# Patient Record
Sex: FEMALE | Race: WHITE | HISPANIC OR LATINO | URBAN - METROPOLITAN AREA
[De-identification: names, ages, dates, MRNs, and addresses within clinical notes are randomized per-mention and may not be internally consistent; named-entity substitution may affect disease eponyms.]

---

## 2024-03-15 ENCOUNTER — INPATIENT (INPATIENT)
Facility: HOSPITAL | Age: 64
LOS: 5 days | Discharge: NURSING, REHAB OR LONG-TERM CARE | End: 2024-03-21
Attending: INTERNAL MEDICINE | Admitting: INTERNAL MEDICINE
Payer: MEDICARE

## 2024-03-15 VITALS
DIASTOLIC BLOOD PRESSURE: 65 MMHG | TEMPERATURE: 98 F | HEART RATE: 78 BPM | OXYGEN SATURATION: 94 % | SYSTOLIC BLOOD PRESSURE: 140 MMHG | RESPIRATION RATE: 18 BRPM

## 2024-03-15 DIAGNOSIS — G93.41 METABOLIC ENCEPHALOPATHY: ICD-10-CM

## 2024-03-15 LAB
ALBUMIN SERPL ELPH-MCNC: 3.3 G/DL — SIGNIFICANT CHANGE UP (ref 3.3–5)
ALBUMIN SERPL ELPH-MCNC: 3.7 G/DL — SIGNIFICANT CHANGE UP (ref 3.3–5)
ALP SERPL-CCNC: 102 U/L — SIGNIFICANT CHANGE UP (ref 40–120)
ALP SERPL-CCNC: 110 U/L — SIGNIFICANT CHANGE UP (ref 40–120)
ALT FLD-CCNC: 31 U/L — SIGNIFICANT CHANGE UP (ref 4–33)
ALT FLD-CCNC: 38 U/L — HIGH (ref 4–33)
ANION GAP SERPL CALC-SCNC: 10 MMOL/L — SIGNIFICANT CHANGE UP (ref 7–14)
ANION GAP SERPL CALC-SCNC: 9 MMOL/L — SIGNIFICANT CHANGE UP (ref 7–14)
AST SERPL-CCNC: 26 U/L — SIGNIFICANT CHANGE UP (ref 4–32)
AST SERPL-CCNC: 47 U/L — HIGH (ref 4–32)
BASE EXCESS BLDV CALC-SCNC: 2.8 MMOL/L — SIGNIFICANT CHANGE UP (ref -2–3)
BASOPHILS # BLD AUTO: 0.05 K/UL — SIGNIFICANT CHANGE UP (ref 0–0.2)
BASOPHILS NFR BLD AUTO: 0.6 % — SIGNIFICANT CHANGE UP (ref 0–2)
BILIRUB SERPL-MCNC: <0.2 MG/DL — SIGNIFICANT CHANGE UP (ref 0.2–1.2)
BILIRUB SERPL-MCNC: <0.2 MG/DL — SIGNIFICANT CHANGE UP (ref 0.2–1.2)
BLOOD GAS VENOUS COMPREHENSIVE RESULT: SIGNIFICANT CHANGE UP
BUN SERPL-MCNC: 58 MG/DL — HIGH (ref 7–23)
BUN SERPL-MCNC: 58 MG/DL — HIGH (ref 7–23)
CALCIUM SERPL-MCNC: 8.6 MG/DL — SIGNIFICANT CHANGE UP (ref 8.4–10.5)
CALCIUM SERPL-MCNC: 9 MG/DL — SIGNIFICANT CHANGE UP (ref 8.4–10.5)
CHLORIDE BLDV-SCNC: 101 MMOL/L — SIGNIFICANT CHANGE UP (ref 96–108)
CHLORIDE SERPL-SCNC: 101 MMOL/L — SIGNIFICANT CHANGE UP (ref 98–107)
CHLORIDE SERPL-SCNC: 99 MMOL/L — SIGNIFICANT CHANGE UP (ref 98–107)
CO2 BLDV-SCNC: 31.2 MMOL/L — HIGH (ref 22–26)
CO2 SERPL-SCNC: 26 MMOL/L — SIGNIFICANT CHANGE UP (ref 22–31)
CO2 SERPL-SCNC: 26 MMOL/L — SIGNIFICANT CHANGE UP (ref 22–31)
CREAT SERPL-MCNC: 1.04 MG/DL — SIGNIFICANT CHANGE UP (ref 0.5–1.3)
CREAT SERPL-MCNC: 1.04 MG/DL — SIGNIFICANT CHANGE UP (ref 0.5–1.3)
EGFR: 60 ML/MIN/1.73M2 — SIGNIFICANT CHANGE UP
EGFR: 60 ML/MIN/1.73M2 — SIGNIFICANT CHANGE UP
EOSINOPHIL # BLD AUTO: 0.14 K/UL — SIGNIFICANT CHANGE UP (ref 0–0.5)
EOSINOPHIL NFR BLD AUTO: 1.5 % — SIGNIFICANT CHANGE UP (ref 0–6)
GAS PNL BLDV: 134 MMOL/L — LOW (ref 136–145)
GLUCOSE BLDC GLUCOMTR-MCNC: 316 MG/DL — HIGH (ref 70–99)
GLUCOSE BLDV-MCNC: 287 MG/DL — HIGH (ref 70–99)
GLUCOSE SERPL-MCNC: 195 MG/DL — HIGH (ref 70–99)
GLUCOSE SERPL-MCNC: 291 MG/DL — HIGH (ref 70–99)
HCO3 BLDV-SCNC: 30 MMOL/L — HIGH (ref 22–29)
HCT VFR BLD CALC: 25.5 % — LOW (ref 34.5–45)
HCT VFR BLDA CALC: 28 % — LOW (ref 34.5–46.5)
HGB BLD CALC-MCNC: 9.2 G/DL — LOW (ref 11.7–16.1)
HGB BLD-MCNC: 8.1 G/DL — LOW (ref 11.5–15.5)
IANC: 6.99 K/UL — SIGNIFICANT CHANGE UP (ref 1.8–7.4)
IMM GRANULOCYTES NFR BLD AUTO: 0.3 % — SIGNIFICANT CHANGE UP (ref 0–0.9)
LACTATE BLDV-MCNC: 1.4 MMOL/L — SIGNIFICANT CHANGE UP (ref 0.5–2)
LYMPHOCYTES # BLD AUTO: 1.18 K/UL — SIGNIFICANT CHANGE UP (ref 1–3.3)
LYMPHOCYTES # BLD AUTO: 13 % — SIGNIFICANT CHANGE UP (ref 13–44)
MAGNESIUM SERPL-MCNC: 3.2 MG/DL — HIGH (ref 1.6–2.6)
MCHC RBC-ENTMCNC: 28.6 PG — SIGNIFICANT CHANGE UP (ref 27–34)
MCHC RBC-ENTMCNC: 31.8 GM/DL — LOW (ref 32–36)
MCV RBC AUTO: 90.1 FL — SIGNIFICANT CHANGE UP (ref 80–100)
MONOCYTES # BLD AUTO: 0.67 K/UL — SIGNIFICANT CHANGE UP (ref 0–0.9)
MONOCYTES NFR BLD AUTO: 7.4 % — SIGNIFICANT CHANGE UP (ref 2–14)
NEUTROPHILS # BLD AUTO: 6.99 K/UL — SIGNIFICANT CHANGE UP (ref 1.8–7.4)
NEUTROPHILS NFR BLD AUTO: 77.2 % — HIGH (ref 43–77)
NRBC # BLD: 0 /100 WBCS — SIGNIFICANT CHANGE UP (ref 0–0)
NRBC # FLD: 0 K/UL — SIGNIFICANT CHANGE UP (ref 0–0)
PCO2 BLDV: 56 MMHG — HIGH (ref 39–52)
PH BLDV: 7.33 — SIGNIFICANT CHANGE UP (ref 7.32–7.43)
PHOSPHATE SERPL-MCNC: 4.8 MG/DL — HIGH (ref 2.5–4.5)
PLATELET # BLD AUTO: 423 K/UL — HIGH (ref 150–400)
PO2 BLDV: 44 MMHG — SIGNIFICANT CHANGE UP (ref 25–45)
POTASSIUM BLDV-SCNC: 6 MMOL/L — HIGH (ref 3.5–5.1)
POTASSIUM SERPL-MCNC: 6.1 MMOL/L — HIGH (ref 3.5–5.3)
POTASSIUM SERPL-MCNC: 6.7 MMOL/L — CRITICAL HIGH (ref 3.5–5.3)
POTASSIUM SERPL-SCNC: 6.1 MMOL/L — HIGH (ref 3.5–5.3)
POTASSIUM SERPL-SCNC: 6.7 MMOL/L — CRITICAL HIGH (ref 3.5–5.3)
PROT SERPL-MCNC: 6.7 G/DL — SIGNIFICANT CHANGE UP (ref 6–8.3)
PROT SERPL-MCNC: 6.8 G/DL — SIGNIFICANT CHANGE UP (ref 6–8.3)
RBC # BLD: 2.83 M/UL — LOW (ref 3.8–5.2)
RBC # FLD: 15.9 % — HIGH (ref 10.3–14.5)
SAO2 % BLDV: 73.4 % — SIGNIFICANT CHANGE UP (ref 67–88)
SODIUM SERPL-SCNC: 135 MMOL/L — SIGNIFICANT CHANGE UP (ref 135–145)
SODIUM SERPL-SCNC: 136 MMOL/L — SIGNIFICANT CHANGE UP (ref 135–145)
TSH SERPL-MCNC: 10.12 UIU/ML — HIGH (ref 0.27–4.2)
WBC # BLD: 9.06 K/UL — SIGNIFICANT CHANGE UP (ref 3.8–10.5)
WBC # FLD AUTO: 9.06 K/UL — SIGNIFICANT CHANGE UP (ref 3.8–10.5)

## 2024-03-15 PROCEDURE — 71045 X-RAY EXAM CHEST 1 VIEW: CPT | Mod: 26

## 2024-03-15 PROCEDURE — 99285 EMERGENCY DEPT VISIT HI MDM: CPT | Mod: FS

## 2024-03-15 RX ORDER — SODIUM CHLORIDE 9 MG/ML
1000 INJECTION INTRAMUSCULAR; INTRAVENOUS; SUBCUTANEOUS ONCE
Refills: 0 | Status: COMPLETED | OUTPATIENT
Start: 2024-03-15 | End: 2024-03-15

## 2024-03-15 RX ORDER — VANCOMYCIN HCL 1 G
1000 VIAL (EA) INTRAVENOUS ONCE
Refills: 0 | Status: COMPLETED | OUTPATIENT
Start: 2024-03-15 | End: 2024-03-15

## 2024-03-15 RX ORDER — CEFEPIME 1 G/1
1000 INJECTION, POWDER, FOR SOLUTION INTRAMUSCULAR; INTRAVENOUS ONCE
Refills: 0 | Status: COMPLETED | OUTPATIENT
Start: 2024-03-15 | End: 2024-03-15

## 2024-03-15 RX ORDER — HALOPERIDOL DECANOATE 100 MG/ML
5 INJECTION INTRAMUSCULAR ONCE
Refills: 0 | Status: DISCONTINUED | OUTPATIENT
Start: 2024-03-15 | End: 2024-03-15

## 2024-03-15 RX ADMIN — CEFEPIME 100 MILLIGRAM(S): 1 INJECTION, POWDER, FOR SOLUTION INTRAMUSCULAR; INTRAVENOUS at 22:29

## 2024-03-15 RX ADMIN — SODIUM CHLORIDE 1000 MILLILITER(S): 9 INJECTION INTRAMUSCULAR; INTRAVENOUS; SUBCUTANEOUS at 22:56

## 2024-03-15 NOTE — ED ADULT NURSE REASSESSMENT NOTE - NS ED NURSE REASSESS COMMENT FT1
Report received from SONY Cool at 20:00. Pt at baseline mental status, calm and cooperative. Pt denies chest pain, SOB, dizziness, headache, blurry vision, chills. Bed in lowest position, call bell within reach. Report given to the floor, pt awaiting pickup.

## 2024-03-15 NOTE — ED PROVIDER NOTE - PHYSICAL EXAMINATION
Vital signs reviewed.   CONSTITUTIONAL: Well-appearing; well-nourished; in no apparent distress. Non-toxic appearing.   HEAD: Normocephalic, atraumatic.  EYES: PERRL, EOM intact, conjunctiva and sclera WNL.  ENT: normal nose; no rhinorrhea; normal pharynx with no tonsillar hypertrophy, no erythema, no exudate, no lymphadenopathy.  NECK/LYMPH: Supple; non-tender; no cervical lymphadenopathy.  CARD: Normal S1, S2; no murmurs, rubs, or gallops noted.  RESP: Normal chest excursion with respiration; breath sounds clear and equal bilaterally; no wheezes, rhonchi, or rales.  ABD/GI: soft, non-distended; non-tender; no palpable organomegaly, no pulsatile mass.  EXT/MS: moves all extremities; distal pulses are normal, no pedal edema.  SKIN: Normal for age and race; warm; dry; good turgor; no apparent lesions or exudate noted. Stage 1 sacral wound  NEURO: Awake, alert, oriented x 4, no gross deficits, CN II-XII grossly intact, no motor or sensory deficit noted.  PSYCH: Normal mood; appropriate affect.

## 2024-03-15 NOTE — ED ADULT TRIAGE NOTE - NS ED TRIAGE CLINICAL UPGRADE PROVIDER FT
charge SONY Link notified pt pxox 88% on 4 liters. Pt hx of COPD on home oxygen. Resp even unlabored.

## 2024-03-15 NOTE — ED ADULT NURSE NOTE - OBJECTIVE STATEMENT
Patient A&OX4. No distress noted. Breathing non-labored. Denies CP, no SOB noted. Labs sent. Left 20G IVL in place and tolerating well. Patient noted saying she doesn't want to go back to Nursing Home. Patient on RA, breathing non-labored. Patient noted with right AKA with prothesis present at bedside. VS stable at this time. Plan of care in progress. Patient noted upset, therapeutic communication provided. OK to give food as per MD.

## 2024-03-15 NOTE — ED ADULT NURSE NOTE - NSFALLUNIVINTERV_ED_ALL_ED
Bed/Stretcher in lowest position, wheels locked, appropriate side rails in place/Call bell, personal items and telephone in reach/Instruct patient to call for assistance before getting out of bed/chair/stretcher/Non-slip footwear applied when patient is off stretcher/[place] to call system/Physically safe environment - no spills, clutter or unnecessary equipment/Purposeful proactive rounding/Room/bathroom lighting operational, light cord in reach

## 2024-03-15 NOTE — ED PROVIDER NOTE - ATTENDING CONTRIBUTION TO CARE
Dr. Beckwith:  I have personally performed a face to face bedside history and physical examination of this patient. I have discussed the history, examination, review of systems, assessment and plan of management with the resident. I have reviewed the electronic medical record and amended it to reflect my history, review of systems, physical exam, assessment and plan.    63F h/o COPD on O2, CKD, IDDM, bipolar disorder, R BKA, sent from Nursing home for aggressive behavior.  REcently diagnosed with PNA.      Exam:  - nad but seems emotionally labile, tearful often  - rrr  - ctab  - abd soft ntnd    A/P  - aggressive behavior at nursing home  - pt desatted while in room to 60%, improved with NC oxygen  - concern for PNA possibly leading to altered behavior  - abx, admit, psych CL consult

## 2024-03-15 NOTE — ED PROVIDER NOTE - CLINICAL SUMMARY MEDICAL DECISION MAKING FREE TEXT BOX
63-year-old female with past medical history of COPD on chronic O2 unclear O2 level, GERD, anemia, CKD, insulin-dependent diabetes, diabetic neuropathy, bipolar disorder, mood disorder, below the knee amputation on right presents to the ER sent in from [facility] for aggressive behavior.  As per triage note patient throwing items in feces.  Patient reports that she became upset when they did not bring her the food that she wanted so she became aggressive, this has happened multiple times, she is not happy with the care, she will be left in her feces for a while, she developed fungal infection to her buttock area because of it. Also notes recent dx of pna. Denies fevers, chills, cough, chest pain, abdominal pain, nausea, vomiting, dysuria, hematuria, diarrhea.  Denies SI, HI, AVH.  r/o electrolyte abn, uti, thyroid dysfunction.   cxr eval pna    likely behavioral. patient A&o x 4, reporting knowledge and her reason behind aggression

## 2024-03-15 NOTE — ED ADULT NURSE NOTE - CHIEF COMPLAINT QUOTE
Patient brought to ER by EMS from [facility] for aggressive behavior. Patient throwing items and feces. Patient is right below knee amputation. Patient has type 2 DM; FS: 365 in field. Patient on 2 liters O2 for COPD.

## 2024-03-15 NOTE — ED ADULT TRIAGE NOTE - CHIEF COMPLAINT QUOTE
Patient brought to ER by EMS from [facility] for aggressive behavior. Patient throwing items and feces. Patient is right below knee amputation. Patient has type 2 DM; FS: 365 in field. Patient on 2 liters O2 for COPD.

## 2024-03-15 NOTE — ED PROVIDER NOTE - NSICDXPASTMEDICALHX_GEN_ALL_CORE_FT
PAST MEDICAL HISTORY:  Bipolar disorder     COPD with hypoxia     DM (diabetes mellitus)     Stage 4 chronic kidney disease

## 2024-03-15 NOTE — ED PROVIDER NOTE - OBJECTIVE STATEMENT
63-year-old female with past medical history of COPD on chronic O2 unclear O2 level, GERD, anemia, CKD, insulin-dependent diabetes, diabetic neuropathy, bipolar disorder, mood disorder, below the knee amputation on right presents to the ER sent in from [facility] for aggressive behavior.  As per triage note patient throwing items in feces.  Patient reports that she became upset when they did not bring her the food that she wanted so she became aggressive, this has happened multiple times, she is not happy with the care, she will be left in her feces for a while, she developed fungal infection to her buttock area because of it. Also notes recent dx of pna. Denies fevers, chills, cough, chest pain, abdominal pain, nausea, vomiting, dysuria, hematuria, diarrhea.  Denies SI, HI, AVH.

## 2024-03-15 NOTE — ED PROVIDER NOTE - ATTENDING APP SHARED VISIT CONTRIBUTION OF CARE
Dr. Beckwith:  I performed a face to face bedside interview with patient regarding history of present illness, review of symptoms and past medical history. I completed an independent physical exam.  I have discussed patient's plan of care with PA.   I agree with note as stated above, having amended the EMR as needed to reflect my findings.   This includes HISTORY OF PRESENT ILLNESS, HIV, PAST MEDICAL/SURGICAL/FAMILY/SOCIAL HISTORY, ALLERGIES AND HOME MEDICATIONS, REVIEW OF SYSTEMS, PHYSICAL EXAM, and any PROGRESS NOTES during the time I functioned as the attending physician for this patient.        63F h/o COPD on O2, CKD, IDDM, bipolar disorder, R BKA, sent from Nursing home for aggressive behavior.  REcently diagnosed with PNA.      Exam:  - nad but seems emotionally labile, tearful often  - rrr  - ctab  - abd soft ntnd    A/P  - aggressive behavior at nursing home  - pt desatted while in room to 60%, improved with NC oxygen  - concern for PNA possibly leading to altered behavior  - abx, admit, psych CL consult

## 2024-03-16 DIAGNOSIS — Z89.511 ACQUIRED ABSENCE OF RIGHT LEG BELOW KNEE: Chronic | ICD-10-CM

## 2024-03-16 DIAGNOSIS — I48.91 UNSPECIFIED ATRIAL FIBRILLATION: ICD-10-CM

## 2024-03-16 DIAGNOSIS — R79.89 OTHER SPECIFIED ABNORMAL FINDINGS OF BLOOD CHEMISTRY: ICD-10-CM

## 2024-03-16 DIAGNOSIS — Z29.9 ENCOUNTER FOR PROPHYLACTIC MEASURES, UNSPECIFIED: ICD-10-CM

## 2024-03-16 DIAGNOSIS — E11.40 TYPE 2 DIABETES MELLITUS WITH DIABETIC NEUROPATHY, UNSPECIFIED: ICD-10-CM

## 2024-03-16 DIAGNOSIS — J90 PLEURAL EFFUSION, NOT ELSEWHERE CLASSIFIED: ICD-10-CM

## 2024-03-16 DIAGNOSIS — I10 ESSENTIAL (PRIMARY) HYPERTENSION: ICD-10-CM

## 2024-03-16 DIAGNOSIS — R45.1 RESTLESSNESS AND AGITATION: ICD-10-CM

## 2024-03-16 DIAGNOSIS — J44.9 CHRONIC OBSTRUCTIVE PULMONARY DISEASE, UNSPECIFIED: ICD-10-CM

## 2024-03-16 DIAGNOSIS — E78.5 HYPERLIPIDEMIA, UNSPECIFIED: ICD-10-CM

## 2024-03-16 DIAGNOSIS — D64.9 ANEMIA, UNSPECIFIED: ICD-10-CM

## 2024-03-16 DIAGNOSIS — N18.30 CHRONIC KIDNEY DISEASE, STAGE 3 UNSPECIFIED: ICD-10-CM

## 2024-03-16 DIAGNOSIS — I50.32 CHRONIC DIASTOLIC (CONGESTIVE) HEART FAILURE: ICD-10-CM

## 2024-03-16 DIAGNOSIS — F31.9 BIPOLAR DISORDER, UNSPECIFIED: ICD-10-CM

## 2024-03-16 DIAGNOSIS — K21.9 GASTRO-ESOPHAGEAL REFLUX DISEASE WITHOUT ESOPHAGITIS: ICD-10-CM

## 2024-03-16 LAB
A1C WITH ESTIMATED AVERAGE GLUCOSE RESULT: 6.1 % — HIGH (ref 4–5.6)
ADD ON TEST-SPECIMEN IN LAB: SIGNIFICANT CHANGE UP
ADD ON TEST-SPECIMEN IN LAB: SIGNIFICANT CHANGE UP
ALBUMIN SERPL ELPH-MCNC: 3.4 G/DL — SIGNIFICANT CHANGE UP (ref 3.3–5)
ALBUMIN SERPL ELPH-MCNC: 3.7 G/DL — SIGNIFICANT CHANGE UP (ref 3.3–5)
ALP SERPL-CCNC: 112 U/L — SIGNIFICANT CHANGE UP (ref 40–120)
ALP SERPL-CCNC: 114 U/L — SIGNIFICANT CHANGE UP (ref 40–120)
ALT FLD-CCNC: 34 U/L — HIGH (ref 4–33)
ALT FLD-CCNC: 40 U/L — HIGH (ref 4–33)
ANION GAP SERPL CALC-SCNC: 9 MMOL/L — SIGNIFICANT CHANGE UP (ref 7–14)
ANION GAP SERPL CALC-SCNC: 9 MMOL/L — SIGNIFICANT CHANGE UP (ref 7–14)
APPEARANCE UR: CLEAR — SIGNIFICANT CHANGE UP
AST SERPL-CCNC: 23 U/L — SIGNIFICANT CHANGE UP (ref 4–32)
AST SERPL-CCNC: 32 U/L — SIGNIFICANT CHANGE UP (ref 4–32)
BASE EXCESS BLDV CALC-SCNC: 2.6 MMOL/L — SIGNIFICANT CHANGE UP (ref -2–3)
BASOPHILS # BLD AUTO: 0.07 K/UL — SIGNIFICANT CHANGE UP (ref 0–0.2)
BASOPHILS NFR BLD AUTO: 0.7 % — SIGNIFICANT CHANGE UP (ref 0–2)
BILIRUB SERPL-MCNC: <0.2 MG/DL — SIGNIFICANT CHANGE UP (ref 0.2–1.2)
BILIRUB SERPL-MCNC: <0.2 MG/DL — SIGNIFICANT CHANGE UP (ref 0.2–1.2)
BILIRUB UR-MCNC: NEGATIVE — SIGNIFICANT CHANGE UP
BLOOD GAS VENOUS COMPREHENSIVE RESULT: SIGNIFICANT CHANGE UP
BUN SERPL-MCNC: 53 MG/DL — HIGH (ref 7–23)
BUN SERPL-MCNC: 55 MG/DL — HIGH (ref 7–23)
CALCIUM SERPL-MCNC: 8.9 MG/DL — SIGNIFICANT CHANGE UP (ref 8.4–10.5)
CALCIUM SERPL-MCNC: 9.3 MG/DL — SIGNIFICANT CHANGE UP (ref 8.4–10.5)
CHLORIDE BLDV-SCNC: 103 MMOL/L — SIGNIFICANT CHANGE UP (ref 96–108)
CHLORIDE SERPL-SCNC: 101 MMOL/L — SIGNIFICANT CHANGE UP (ref 98–107)
CHLORIDE SERPL-SCNC: 102 MMOL/L — SIGNIFICANT CHANGE UP (ref 98–107)
CHOLEST SERPL-MCNC: 184 MG/DL — SIGNIFICANT CHANGE UP
CK SERPL-CCNC: 85 U/L — SIGNIFICANT CHANGE UP (ref 25–170)
CO2 BLDV-SCNC: 31.1 MMOL/L — HIGH (ref 22–26)
CO2 SERPL-SCNC: 26 MMOL/L — SIGNIFICANT CHANGE UP (ref 22–31)
CO2 SERPL-SCNC: 26 MMOL/L — SIGNIFICANT CHANGE UP (ref 22–31)
COLOR SPEC: YELLOW — SIGNIFICANT CHANGE UP
CREAT SERPL-MCNC: 0.96 MG/DL — SIGNIFICANT CHANGE UP (ref 0.5–1.3)
CREAT SERPL-MCNC: 1.06 MG/DL — SIGNIFICANT CHANGE UP (ref 0.5–1.3)
DIFF PNL FLD: ABNORMAL
EGFR: 58 ML/MIN/1.73M2 — LOW
EGFR: 66 ML/MIN/1.73M2 — SIGNIFICANT CHANGE UP
EOSINOPHIL # BLD AUTO: 0.16 K/UL — SIGNIFICANT CHANGE UP (ref 0–0.5)
EOSINOPHIL NFR BLD AUTO: 1.5 % — SIGNIFICANT CHANGE UP (ref 0–6)
ESTIMATED AVERAGE GLUCOSE: 128 — SIGNIFICANT CHANGE UP
GAS PNL BLDV: 135 MMOL/L — LOW (ref 136–145)
GLUCOSE BLDC GLUCOMTR-MCNC: 154 MG/DL — HIGH (ref 70–99)
GLUCOSE BLDC GLUCOMTR-MCNC: 155 MG/DL — HIGH (ref 70–99)
GLUCOSE BLDC GLUCOMTR-MCNC: 208 MG/DL — HIGH (ref 70–99)
GLUCOSE BLDC GLUCOMTR-MCNC: 214 MG/DL — HIGH (ref 70–99)
GLUCOSE BLDC GLUCOMTR-MCNC: 98 MG/DL — SIGNIFICANT CHANGE UP (ref 70–99)
GLUCOSE BLDV-MCNC: 200 MG/DL — HIGH (ref 70–99)
GLUCOSE SERPL-MCNC: 171 MG/DL — HIGH (ref 70–99)
GLUCOSE SERPL-MCNC: 204 MG/DL — HIGH (ref 70–99)
GLUCOSE UR QL: >=1000 MG/DL
HCO3 BLDV-SCNC: 29 MMOL/L — SIGNIFICANT CHANGE UP (ref 22–29)
HCT VFR BLD CALC: 26.1 % — LOW (ref 34.5–45)
HCT VFR BLDA CALC: 26 % — LOW (ref 34.5–46.5)
HDLC SERPL-MCNC: 86 MG/DL — SIGNIFICANT CHANGE UP
HGB BLD CALC-MCNC: 8.7 G/DL — LOW (ref 11.7–16.1)
HGB BLD-MCNC: 8.4 G/DL — LOW (ref 11.5–15.5)
IANC: 8.67 K/UL — HIGH (ref 1.8–7.4)
IMM GRANULOCYTES NFR BLD AUTO: 0.3 % — SIGNIFICANT CHANGE UP (ref 0–0.9)
KETONES UR-MCNC: NEGATIVE MG/DL — SIGNIFICANT CHANGE UP
LACTATE BLDV-MCNC: 0.7 MMOL/L — SIGNIFICANT CHANGE UP (ref 0.5–2)
LEUKOCYTE ESTERASE UR-ACNC: NEGATIVE — SIGNIFICANT CHANGE UP
LIPID PNL WITH DIRECT LDL SERPL: 87 MG/DL — SIGNIFICANT CHANGE UP
LYMPHOCYTES # BLD AUTO: 0.84 K/UL — LOW (ref 1–3.3)
LYMPHOCYTES # BLD AUTO: 8 % — LOW (ref 13–44)
MAGNESIUM SERPL-MCNC: 3 MG/DL — HIGH (ref 1.6–2.6)
MAGNESIUM SERPL-MCNC: 3.1 MG/DL — HIGH (ref 1.6–2.6)
MCHC RBC-ENTMCNC: 29.2 PG — SIGNIFICANT CHANGE UP (ref 27–34)
MCHC RBC-ENTMCNC: 32.2 GM/DL — SIGNIFICANT CHANGE UP (ref 32–36)
MCV RBC AUTO: 90.6 FL — SIGNIFICANT CHANGE UP (ref 80–100)
MONOCYTES # BLD AUTO: 0.71 K/UL — SIGNIFICANT CHANGE UP (ref 0–0.9)
MONOCYTES NFR BLD AUTO: 6.8 % — SIGNIFICANT CHANGE UP (ref 2–14)
NEUTROPHILS # BLD AUTO: 8.67 K/UL — HIGH (ref 1.8–7.4)
NEUTROPHILS NFR BLD AUTO: 82.7 % — HIGH (ref 43–77)
NITRITE UR-MCNC: NEGATIVE — SIGNIFICANT CHANGE UP
NON HDL CHOLESTEROL: 98 MG/DL — SIGNIFICANT CHANGE UP
NRBC # BLD: 0 /100 WBCS — SIGNIFICANT CHANGE UP (ref 0–0)
NRBC # FLD: 0 K/UL — SIGNIFICANT CHANGE UP (ref 0–0)
NT-PROBNP SERPL-SCNC: 8356 PG/ML — HIGH
PCO2 BLDV: 57 MMHG — HIGH (ref 39–52)
PH BLDV: 7.32 — SIGNIFICANT CHANGE UP (ref 7.32–7.43)
PH UR: 5.5 — SIGNIFICANT CHANGE UP (ref 5–8)
PHOSPHATE SERPL-MCNC: 4.5 MG/DL — SIGNIFICANT CHANGE UP (ref 2.5–4.5)
PHOSPHATE SERPL-MCNC: 4.9 MG/DL — HIGH (ref 2.5–4.5)
PLATELET # BLD AUTO: 401 K/UL — HIGH (ref 150–400)
PO2 BLDV: 48 MMHG — HIGH (ref 25–45)
POTASSIUM BLDV-SCNC: 5.5 MMOL/L — HIGH (ref 3.5–5.1)
POTASSIUM SERPL-MCNC: 5.3 MMOL/L — SIGNIFICANT CHANGE UP (ref 3.5–5.3)
POTASSIUM SERPL-MCNC: 5.4 MMOL/L — HIGH (ref 3.5–5.3)
POTASSIUM SERPL-SCNC: 5.3 MMOL/L — SIGNIFICANT CHANGE UP (ref 3.5–5.3)
POTASSIUM SERPL-SCNC: 5.4 MMOL/L — HIGH (ref 3.5–5.3)
PROT SERPL-MCNC: 6.4 G/DL — SIGNIFICANT CHANGE UP (ref 6–8.3)
PROT SERPL-MCNC: 6.6 G/DL — SIGNIFICANT CHANGE UP (ref 6–8.3)
PROT UR-MCNC: >=1000 MG/DL
RBC # BLD: 2.88 M/UL — LOW (ref 3.8–5.2)
RBC # FLD: 15.8 % — HIGH (ref 10.3–14.5)
SAO2 % BLDV: 80.6 % — SIGNIFICANT CHANGE UP (ref 67–88)
SODIUM SERPL-SCNC: 136 MMOL/L — SIGNIFICANT CHANGE UP (ref 135–145)
SODIUM SERPL-SCNC: 137 MMOL/L — SIGNIFICANT CHANGE UP (ref 135–145)
SP GR SPEC: 1.03 — SIGNIFICANT CHANGE UP (ref 1–1.03)
T4 FREE SERPL-MCNC: 1 NG/DL — SIGNIFICANT CHANGE UP (ref 0.9–1.8)
T4/T3 UPTAKE INDEX SERPL: 1.1 TBI — SIGNIFICANT CHANGE UP (ref 0.8–1.3)
TRIGL SERPL-MCNC: 53 MG/DL — SIGNIFICANT CHANGE UP
TROPONIN T, HIGH SENSITIVITY RESULT: 104 NG/L — CRITICAL HIGH
TROPONIN T, HIGH SENSITIVITY RESULT: 112 NG/L — CRITICAL HIGH
TROPONIN T, HIGH SENSITIVITY RESULT: 116 NG/L — CRITICAL HIGH
UROBILINOGEN FLD QL: 0.2 MG/DL — SIGNIFICANT CHANGE UP (ref 0.2–1)
WBC # BLD: 10.48 K/UL — SIGNIFICANT CHANGE UP (ref 3.8–10.5)
WBC # FLD AUTO: 10.48 K/UL — SIGNIFICANT CHANGE UP (ref 3.8–10.5)

## 2024-03-16 PROCEDURE — 71250 CT THORAX DX C-: CPT | Mod: 26

## 2024-03-16 PROCEDURE — G0316 PROLONG INPT EVAL ADD15 M: CPT

## 2024-03-16 PROCEDURE — 93010 ELECTROCARDIOGRAM REPORT: CPT

## 2024-03-16 PROCEDURE — 99223 1ST HOSP IP/OBS HIGH 75: CPT

## 2024-03-16 PROCEDURE — 90792 PSYCH DIAG EVAL W/MED SRVCS: CPT

## 2024-03-16 RX ORDER — DEXTROSE 50 % IN WATER 50 %
25 SYRINGE (ML) INTRAVENOUS ONCE
Refills: 0 | Status: DISCONTINUED | OUTPATIENT
Start: 2024-03-16 | End: 2024-03-16

## 2024-03-16 RX ORDER — ACETAMINOPHEN 500 MG
650 TABLET ORAL EVERY 6 HOURS
Refills: 0 | Status: DISCONTINUED | OUTPATIENT
Start: 2024-03-16 | End: 2024-03-21

## 2024-03-16 RX ORDER — INSULIN LISPRO 100/ML
VIAL (ML) SUBCUTANEOUS AT BEDTIME
Refills: 0 | Status: DISCONTINUED | OUTPATIENT
Start: 2024-03-16 | End: 2024-03-16

## 2024-03-16 RX ORDER — LABETALOL HCL 100 MG
300 TABLET ORAL EVERY 8 HOURS
Refills: 0 | Status: DISCONTINUED | OUTPATIENT
Start: 2024-03-16 | End: 2024-03-21

## 2024-03-16 RX ORDER — APIXABAN 2.5 MG/1
1 TABLET, FILM COATED ORAL
Refills: 0 | DISCHARGE

## 2024-03-16 RX ORDER — LANOLIN ALCOHOL/MO/W.PET/CERES
1 CREAM (GRAM) TOPICAL
Refills: 0 | DISCHARGE

## 2024-03-16 RX ORDER — SODIUM CHLORIDE 9 MG/ML
1000 INJECTION, SOLUTION INTRAVENOUS
Refills: 0 | Status: DISCONTINUED | OUTPATIENT
Start: 2024-03-16 | End: 2024-03-16

## 2024-03-16 RX ORDER — BUMETANIDE 0.25 MG/ML
0 INJECTION INTRAMUSCULAR; INTRAVENOUS
Qty: 0 | Refills: 0 | DISCHARGE

## 2024-03-16 RX ORDER — IPRATROPIUM/ALBUTEROL SULFATE 18-103MCG
3 AEROSOL WITH ADAPTER (GRAM) INHALATION EVERY 6 HOURS
Refills: 0 | Status: DISCONTINUED | OUTPATIENT
Start: 2024-03-16 | End: 2024-03-21

## 2024-03-16 RX ORDER — DIVALPROEX SODIUM 500 MG/1
250 TABLET, DELAYED RELEASE ORAL DAILY
Refills: 0 | Status: DISCONTINUED | OUTPATIENT
Start: 2024-03-16 | End: 2024-03-16

## 2024-03-16 RX ORDER — INSULIN GLARGINE 100 [IU]/ML
0 INJECTION, SOLUTION SUBCUTANEOUS
Qty: 0 | Refills: 0 | DISCHARGE

## 2024-03-16 RX ORDER — ALBUTEROL 90 UG/1
0 AEROSOL, METERED ORAL
Qty: 0 | Refills: 0 | DISCHARGE

## 2024-03-16 RX ORDER — SPIRONOLACTONE 25 MG/1
0 TABLET, FILM COATED ORAL
Qty: 0 | Refills: 0 | DISCHARGE

## 2024-03-16 RX ORDER — OLANZAPINE 15 MG/1
1.25 TABLET, FILM COATED ORAL EVERY 6 HOURS
Refills: 0 | Status: DISCONTINUED | OUTPATIENT
Start: 2024-03-16 | End: 2024-03-21

## 2024-03-16 RX ORDER — EMPAGLIFLOZIN 10 MG/1
0 TABLET, FILM COATED ORAL
Qty: 0 | Refills: 0 | DISCHARGE

## 2024-03-16 RX ORDER — LIDOCAINE 4 G/100G
1 CREAM TOPICAL
Refills: 0 | DISCHARGE

## 2024-03-16 RX ORDER — DEXTROSE 50 % IN WATER 50 %
50 SYRINGE (ML) INTRAVENOUS ONCE
Refills: 0 | Status: COMPLETED | OUTPATIENT
Start: 2024-03-16 | End: 2024-03-16

## 2024-03-16 RX ORDER — LABETALOL HCL 100 MG
3 TABLET ORAL
Refills: 0 | DISCHARGE

## 2024-03-16 RX ORDER — PIPERACILLIN AND TAZOBACTAM 4; .5 G/20ML; G/20ML
3.38 INJECTION, POWDER, LYOPHILIZED, FOR SOLUTION INTRAVENOUS EVERY 8 HOURS
Refills: 0 | Status: DISCONTINUED | OUTPATIENT
Start: 2024-03-16 | End: 2024-03-20

## 2024-03-16 RX ORDER — DEXTROSE 50 % IN WATER 50 %
12.5 SYRINGE (ML) INTRAVENOUS ONCE
Refills: 0 | Status: DISCONTINUED | OUTPATIENT
Start: 2024-03-16 | End: 2024-03-21

## 2024-03-16 RX ORDER — INSULIN LISPRO 100/ML
VIAL (ML) SUBCUTANEOUS
Refills: 0 | Status: DISCONTINUED | OUTPATIENT
Start: 2024-03-16 | End: 2024-03-16

## 2024-03-16 RX ORDER — SIMVASTATIN 20 MG/1
0 TABLET, FILM COATED ORAL
Qty: 0 | Refills: 0 | DISCHARGE

## 2024-03-16 RX ORDER — SODIUM ZIRCONIUM CYCLOSILICATE 10 G/10G
5 POWDER, FOR SUSPENSION ORAL ONCE
Refills: 0 | Status: COMPLETED | OUTPATIENT
Start: 2024-03-16 | End: 2024-03-16

## 2024-03-16 RX ORDER — INSULIN LISPRO 100/ML
6 VIAL (ML) SUBCUTANEOUS
Refills: 0 | Status: DISCONTINUED | OUTPATIENT
Start: 2024-03-16 | End: 2024-03-21

## 2024-03-16 RX ORDER — SODIUM CHLORIDE 9 MG/ML
1000 INJECTION, SOLUTION INTRAVENOUS
Refills: 0 | Status: DISCONTINUED | OUTPATIENT
Start: 2024-03-16 | End: 2024-03-21

## 2024-03-16 RX ORDER — DIVALPROEX SODIUM 500 MG/1
0 TABLET, DELAYED RELEASE ORAL
Qty: 0 | Refills: 0 | DISCHARGE

## 2024-03-16 RX ORDER — DEXTROSE 50 % IN WATER 50 %
15 SYRINGE (ML) INTRAVENOUS ONCE
Refills: 0 | Status: DISCONTINUED | OUTPATIENT
Start: 2024-03-16 | End: 2024-03-16

## 2024-03-16 RX ORDER — GLUCAGON INJECTION, SOLUTION 0.5 MG/.1ML
1 INJECTION, SOLUTION SUBCUTANEOUS ONCE
Refills: 0 | Status: DISCONTINUED | OUTPATIENT
Start: 2024-03-16 | End: 2024-03-16

## 2024-03-16 RX ORDER — INFLUENZA VIRUS VACCINE 15; 15; 15; 15 UG/.5ML; UG/.5ML; UG/.5ML; UG/.5ML
0.7 SUSPENSION INTRAMUSCULAR ONCE
Refills: 0 | Status: DISCONTINUED | OUTPATIENT
Start: 2024-03-16 | End: 2024-03-21

## 2024-03-16 RX ORDER — ALBUTEROL 90 UG/1
3 AEROSOL, METERED ORAL
Refills: 0 | DISCHARGE

## 2024-03-16 RX ORDER — INSULIN LISPRO 100/ML
VIAL (ML) SUBCUTANEOUS AT BEDTIME
Refills: 0 | Status: DISCONTINUED | OUTPATIENT
Start: 2024-03-16 | End: 2024-03-21

## 2024-03-16 RX ORDER — CALCIUM GLUCONATE 100 MG/ML
2 VIAL (ML) INTRAVENOUS ONCE
Refills: 0 | Status: COMPLETED | OUTPATIENT
Start: 2024-03-16 | End: 2024-03-16

## 2024-03-16 RX ORDER — BUDESONIDE AND FORMOTEROL FUMARATE DIHYDRATE 160; 4.5 UG/1; UG/1
0 AEROSOL RESPIRATORY (INHALATION)
Qty: 0 | Refills: 0 | DISCHARGE

## 2024-03-16 RX ORDER — IPRATROPIUM/ALBUTEROL SULFATE 18-103MCG
3 AEROSOL WITH ADAPTER (GRAM) INHALATION
Refills: 0 | DISCHARGE

## 2024-03-16 RX ORDER — DEXTROSE 50 % IN WATER 50 %
25 SYRINGE (ML) INTRAVENOUS ONCE
Refills: 0 | Status: DISCONTINUED | OUTPATIENT
Start: 2024-03-16 | End: 2024-03-21

## 2024-03-16 RX ORDER — NIFEDIPINE 30 MG
30 TABLET, EXTENDED RELEASE 24 HR ORAL DAILY
Refills: 0 | Status: DISCONTINUED | OUTPATIENT
Start: 2024-03-16 | End: 2024-03-21

## 2024-03-16 RX ORDER — INSULIN HUMAN 100 [IU]/ML
10 INJECTION, SOLUTION SUBCUTANEOUS ONCE
Refills: 0 | Status: COMPLETED | OUTPATIENT
Start: 2024-03-16 | End: 2024-03-16

## 2024-03-16 RX ORDER — INSULIN LISPRO 100/ML
0 VIAL (ML) SUBCUTANEOUS
Qty: 0 | Refills: 0 | DISCHARGE

## 2024-03-16 RX ORDER — BUDESONIDE AND FORMOTEROL FUMARATE DIHYDRATE 160; 4.5 UG/1; UG/1
2 AEROSOL RESPIRATORY (INHALATION)
Refills: 0 | Status: DISCONTINUED | OUTPATIENT
Start: 2024-03-16 | End: 2024-03-21

## 2024-03-16 RX ORDER — HYDRALAZINE HCL 50 MG
50 TABLET ORAL EVERY 8 HOURS
Refills: 0 | Status: DISCONTINUED | OUTPATIENT
Start: 2024-03-16 | End: 2024-03-21

## 2024-03-16 RX ORDER — LIDOCAINE 4 G/100G
1 CREAM TOPICAL DAILY
Refills: 0 | Status: DISCONTINUED | OUTPATIENT
Start: 2024-03-16 | End: 2024-03-21

## 2024-03-16 RX ORDER — LABETALOL HCL 100 MG
0 TABLET ORAL
Qty: 0 | Refills: 0 | DISCHARGE

## 2024-03-16 RX ORDER — AMIODARONE HYDROCHLORIDE 400 MG/1
1 TABLET ORAL
Refills: 0 | DISCHARGE

## 2024-03-16 RX ORDER — AMIODARONE HYDROCHLORIDE 400 MG/1
200 TABLET ORAL DAILY
Refills: 0 | Status: DISCONTINUED | OUTPATIENT
Start: 2024-03-16 | End: 2024-03-21

## 2024-03-16 RX ORDER — DEXTROSE 50 % IN WATER 50 %
15 SYRINGE (ML) INTRAVENOUS ONCE
Refills: 0 | Status: DISCONTINUED | OUTPATIENT
Start: 2024-03-16 | End: 2024-03-21

## 2024-03-16 RX ORDER — PANTOPRAZOLE SODIUM 20 MG/1
40 TABLET, DELAYED RELEASE ORAL
Refills: 0 | Status: DISCONTINUED | OUTPATIENT
Start: 2024-03-16 | End: 2024-03-21

## 2024-03-16 RX ORDER — ONDANSETRON 8 MG/1
4 TABLET, FILM COATED ORAL EVERY 8 HOURS
Refills: 0 | Status: DISCONTINUED | OUTPATIENT
Start: 2024-03-16 | End: 2024-03-21

## 2024-03-16 RX ORDER — INSULIN LISPRO 100/ML
VIAL (ML) SUBCUTANEOUS
Refills: 0 | Status: DISCONTINUED | OUTPATIENT
Start: 2024-03-16 | End: 2024-03-21

## 2024-03-16 RX ORDER — ERGOCALCIFEROL 1.25 MG/1
1 CAPSULE ORAL
Refills: 0 | DISCHARGE

## 2024-03-16 RX ORDER — ERTUGLIFLOZIN 5 MG/1
1 TABLET, FILM COATED ORAL
Refills: 0 | DISCHARGE

## 2024-03-16 RX ORDER — HYDROCORTISONE 1 %
1 OINTMENT (GRAM) TOPICAL
Refills: 0 | DISCHARGE

## 2024-03-16 RX ORDER — INSULIN GLARGINE 100 [IU]/ML
18 INJECTION, SOLUTION SUBCUTANEOUS AT BEDTIME
Refills: 0 | Status: DISCONTINUED | OUTPATIENT
Start: 2024-03-16 | End: 2024-03-21

## 2024-03-16 RX ORDER — IPRATROPIUM/ALBUTEROL SULFATE 18-103MCG
0 AEROSOL WITH ADAPTER (GRAM) INHALATION
Qty: 0 | Refills: 0 | DISCHARGE

## 2024-03-16 RX ORDER — ERTUGLIFLOZIN 5 MG/1
0 TABLET, FILM COATED ORAL
Qty: 0 | Refills: 0 | DISCHARGE

## 2024-03-16 RX ORDER — APIXABAN 2.5 MG/1
0 TABLET, FILM COATED ORAL
Qty: 0 | Refills: 0 | DISCHARGE

## 2024-03-16 RX ORDER — SIMVASTATIN 20 MG/1
1 TABLET, FILM COATED ORAL
Refills: 0 | DISCHARGE

## 2024-03-16 RX ORDER — DEXTROSE 50 % IN WATER 50 %
12.5 SYRINGE (ML) INTRAVENOUS ONCE
Refills: 0 | Status: DISCONTINUED | OUTPATIENT
Start: 2024-03-16 | End: 2024-03-16

## 2024-03-16 RX ORDER — LANOLIN ALCOHOL/MO/W.PET/CERES
3 CREAM (GRAM) TOPICAL AT BEDTIME
Refills: 0 | Status: DISCONTINUED | OUTPATIENT
Start: 2024-03-16 | End: 2024-03-21

## 2024-03-16 RX ORDER — AMIODARONE HYDROCHLORIDE 400 MG/1
0 TABLET ORAL
Qty: 0 | Refills: 0 | DISCHARGE

## 2024-03-16 RX ORDER — GLUCAGON INJECTION, SOLUTION 0.5 MG/.1ML
1 INJECTION, SOLUTION SUBCUTANEOUS ONCE
Refills: 0 | Status: DISCONTINUED | OUTPATIENT
Start: 2024-03-16 | End: 2024-03-21

## 2024-03-16 RX ORDER — NIFEDIPINE 30 MG
0 TABLET, EXTENDED RELEASE 24 HR ORAL
Qty: 0 | Refills: 0 | DISCHARGE

## 2024-03-16 RX ORDER — DIVALPROEX SODIUM 500 MG/1
250 TABLET, DELAYED RELEASE ORAL
Refills: 0 | Status: DISCONTINUED | OUTPATIENT
Start: 2024-03-16 | End: 2024-03-16

## 2024-03-16 RX ORDER — HYDRALAZINE HCL 50 MG
0 TABLET ORAL
Qty: 0 | Refills: 0 | DISCHARGE

## 2024-03-16 RX ORDER — NIFEDIPINE 30 MG
1 TABLET, EXTENDED RELEASE 24 HR ORAL
Refills: 0 | DISCHARGE

## 2024-03-16 RX ORDER — DIVALPROEX SODIUM 500 MG/1
250 TABLET, DELAYED RELEASE ORAL
Refills: 0 | Status: DISCONTINUED | OUTPATIENT
Start: 2024-03-16 | End: 2024-03-21

## 2024-03-16 RX ORDER — SIMVASTATIN 20 MG/1
20 TABLET, FILM COATED ORAL AT BEDTIME
Refills: 0 | Status: DISCONTINUED | OUTPATIENT
Start: 2024-03-16 | End: 2024-03-21

## 2024-03-16 RX ORDER — OMEPRAZOLE 10 MG/1
2 CAPSULE, DELAYED RELEASE ORAL
Refills: 0 | DISCHARGE

## 2024-03-16 RX ORDER — APIXABAN 2.5 MG/1
5 TABLET, FILM COATED ORAL EVERY 12 HOURS
Refills: 0 | Status: DISCONTINUED | OUTPATIENT
Start: 2024-03-16 | End: 2024-03-21

## 2024-03-16 RX ADMIN — APIXABAN 5 MILLIGRAM(S): 2.5 TABLET, FILM COATED ORAL at 17:43

## 2024-03-16 RX ADMIN — PIPERACILLIN AND TAZOBACTAM 25 GRAM(S): 4; .5 INJECTION, POWDER, LYOPHILIZED, FOR SOLUTION INTRAVENOUS at 13:02

## 2024-03-16 RX ADMIN — BUDESONIDE AND FORMOTEROL FUMARATE DIHYDRATE 2 PUFF(S): 160; 4.5 AEROSOL RESPIRATORY (INHALATION) at 21:49

## 2024-03-16 RX ADMIN — BUDESONIDE AND FORMOTEROL FUMARATE DIHYDRATE 2 PUFF(S): 160; 4.5 AEROSOL RESPIRATORY (INHALATION) at 08:51

## 2024-03-16 RX ADMIN — Medication 30 MILLIGRAM(S): at 08:50

## 2024-03-16 RX ADMIN — Medication 50 MILLIGRAM(S): at 21:53

## 2024-03-16 RX ADMIN — DIVALPROEX SODIUM 250 MILLIGRAM(S): 500 TABLET, DELAYED RELEASE ORAL at 12:54

## 2024-03-16 RX ADMIN — SIMVASTATIN 20 MILLIGRAM(S): 20 TABLET, FILM COATED ORAL at 21:52

## 2024-03-16 RX ADMIN — Medication 1: at 08:48

## 2024-03-16 RX ADMIN — Medication 50 MILLILITER(S): at 03:47

## 2024-03-16 RX ADMIN — Medication 50 MILLIGRAM(S): at 10:10

## 2024-03-16 RX ADMIN — BUDESONIDE AND FORMOTEROL FUMARATE DIHYDRATE 2 PUFF(S): 160; 4.5 AEROSOL RESPIRATORY (INHALATION) at 03:29

## 2024-03-16 RX ADMIN — Medication 6 UNIT(S): at 12:53

## 2024-03-16 RX ADMIN — INSULIN GLARGINE 18 UNIT(S): 100 INJECTION, SOLUTION SUBCUTANEOUS at 22:23

## 2024-03-16 RX ADMIN — Medication 1: at 17:41

## 2024-03-16 RX ADMIN — Medication 250 MILLIGRAM(S): at 01:26

## 2024-03-16 RX ADMIN — SODIUM ZIRCONIUM CYCLOSILICATE 5 GRAM(S): 10 POWDER, FOR SUSPENSION ORAL at 06:48

## 2024-03-16 RX ADMIN — Medication 300 MILLIGRAM(S): at 13:02

## 2024-03-16 RX ADMIN — Medication 2: at 12:53

## 2024-03-16 RX ADMIN — Medication 3 MILLIGRAM(S): at 21:53

## 2024-03-16 RX ADMIN — Medication 6 UNIT(S): at 17:41

## 2024-03-16 RX ADMIN — Medication 300 MILLIGRAM(S): at 21:52

## 2024-03-16 RX ADMIN — PIPERACILLIN AND TAZOBACTAM 25 GRAM(S): 4; .5 INJECTION, POWDER, LYOPHILIZED, FOR SOLUTION INTRAVENOUS at 21:49

## 2024-03-16 RX ADMIN — DIVALPROEX SODIUM 250 MILLIGRAM(S): 500 TABLET, DELAYED RELEASE ORAL at 21:58

## 2024-03-16 RX ADMIN — INSULIN HUMAN 10 UNIT(S): 100 INJECTION, SOLUTION SUBCUTANEOUS at 03:49

## 2024-03-16 RX ADMIN — AMIODARONE HYDROCHLORIDE 200 MILLIGRAM(S): 400 TABLET ORAL at 08:50

## 2024-03-16 RX ADMIN — Medication 200 GRAM(S): at 04:21

## 2024-03-16 RX ADMIN — PANTOPRAZOLE SODIUM 40 MILLIGRAM(S): 20 TABLET, DELAYED RELEASE ORAL at 08:50

## 2024-03-16 NOTE — PATIENT PROFILE ADULT - FALL HARM RISK - HARM RISK INTERVENTIONS
Assistance with ambulation/Assistance OOB with selected safe patient handling equipment/Communicate Risk of Fall with Harm to all staff/Discuss with provider need for PT consult/Monitor gait and stability/Reinforce activity limits and safety measures with patient and family/Tailored Fall Risk Interventions/Use of alarms - bed, chair and/or voice tab/Visual Cue: Yellow wristband and red socks/Bed in lowest position, wheels locked, appropriate side rails in place/Call bell, personal items and telephone in reach/Instruct patient to call for assistance before getting out of bed or chair/Non-slip footwear when patient is out of bed/[place] to call system/Physically safe environment - no spills, clutter or unnecessary equipment/Purposeful Proactive Rounding/Room/bathroom lighting operational, light cord in reach

## 2024-03-16 NOTE — PROGRESS NOTE ADULT - PROBLEM SELECTOR PLAN 12
DVT ppx - Eliquis  Diet - On regular texture and thin consistency diet at [facility] -> will place on regular DASH/CC 1.5L fluid restriction diet here  Activity - OOB with assistance, increase as tolerated    Fall and aspiration precautions

## 2024-03-16 NOTE — H&P ADULT - PROBLEM SELECTOR PLAN 7
- c/w NC, c/w inhaler therapy (of note, patient was prescribed symbicort as per [employer] but not on the medication list from her [facility], will c/w for now)

## 2024-03-16 NOTE — H&P ADULT - PROBLEM SELECTOR PLAN 12
DVT ppx - Eliquis  Diet - On regular texture and thin consistency diet at [facility] -> will place on regular DASH/CC 1.5L fluid restriction diet here  Activity - OOB with assistance, increase as tolerated    Fall and aspiration precautions

## 2024-03-16 NOTE — H&P ADULT - NSICDXPASTMEDICALHX_GEN_ALL_CORE_FT
PAST MEDICAL HISTORY:  Anemia     Bipolar disorder     Chronic diastolic heart failure     COPD with hypoxia     DM (diabetes mellitus)     Essential hypertension     GERD (gastroesophageal reflux disease)     Hyperlipemia     Stage 4 chronic kidney disease     Unspecified atrial fibrillation

## 2024-03-16 NOTE — PATIENT PROFILE ADULT - OVER THE PAST TWO WEEKS, HAVE YOU FELT LITTLE INTEREST OR PLEASURE IN DOING THINGS?
From home  monitor ILEANA, on IVF  plan for Esophagram/follow up GI r  PT consulted From home  monitor ILEANA, on IVF  plan for Esophagram/follow up GI r  PT consulted From home  monitor ILEANA, on IVF  plan for Esophagram/follow up GI reccs 3/1  PT consulted no

## 2024-03-16 NOTE — BH CONSULTATION LIAISON ASSESSMENT NOTE - RISK ASSESSMENT
Risk Factors: acute/chronic medical conditions, prolonged hospital stay, agitation  Protective Factors: residential stability, supportive family

## 2024-03-16 NOTE — BH CONSULTATION LIAISON ASSESSMENT NOTE - NSBHCHARTREVIEWLAB_PSY_A_CORE FT
CBC Full  -  ( 16 Mar 2024 09:28 )  WBC Count : 10.48 K/uL  RBC Count : 2.88 M/uL  Hemoglobin : 8.4 g/dL  Hematocrit : 26.1 %  Platelet Count - Automated : 401 K/uL  Mean Cell Volume : 90.6 fL  Mean Cell Hemoglobin : 29.2 pg  Mean Cell Hemoglobin Concentration : 32.2 gm/dL  Auto Neutrophil # : 8.67 K/uL  Auto Lymphocyte # : 0.84 K/uL  Auto Monocyte # : 0.71 K/uL  Auto Eosinophil # : 0.16 K/uL  Auto Basophil # : 0.07 K/uL  Auto Neutrophil % : 82.7 %  Auto Lymphocyte % : 8.0 %  Auto Monocyte % : 6.8 %  Auto Eosinophil % : 1.5 %  Auto Basophil % : 0.7 %  03-16    137  |  102  |  53<H>  ----------------------------<  171<H>  5.3   |  26  |  0.96    Ca    9.3      16 Mar 2024 09:28  Phos  4.5     03-16  Mg     3.00     03-16    TPro  6.4  /  Alb  3.4  /  TBili  <0.2  /  DBili  x   /  AST  23  /  ALT  34<H>  /  AlkPhos  112  03-16

## 2024-03-16 NOTE — H&P ADULT - PROBLEM/PLAN-2
Dr Braswell DISPLAY PLAN FREE TEXT

## 2024-03-16 NOTE — H&P ADULT - PROBLEM SELECTOR PLAN 3
- Seems to have CKD2-3 on review of labs on HIE  - Here with new hyperkalemia s/p medical management, EKG without concerning changes 2/2 hyperkalemia  - Will repeat potassium with AM labs and reassess for treatment  - check UA and UCx (given reports of ?pathogen? in urine)

## 2024-03-16 NOTE — H&P ADULT - ASSESSMENT
This is a 63F with documented history of CHFpEF, Pulmonary aspergillosis/candidiasis, AFib on Eliquis, DM2 with diabetic neuropathy, COPD on 3L NC, Bipolar/Mood disorder, CKD, HTN, HLD, GERD, and Anemia who presents to the hospital for agitation at her [facility] with concern for decompensated psychiatric illness.

## 2024-03-16 NOTE — PATIENT PROFILE ADULT - FUNCTIONAL ASSESSMENT - BASIC MOBILITY 6.
1-calculated by average/Not able to assess (calculate score using [facility] averaging method)

## 2024-03-16 NOTE — H&P ADULT - PROBLEM SELECTOR PLAN 2
- Has b/l pleural effusions on CXR with history of CHFpEF and recent cough (was placed on augmentin at her [facility]) but unclear if the effusions are there 2/2 history of heart failure, history of pulmonary aspergillosis/candidiasis vs PNA vs other  - Daughter states that the patient had fluid drained from her lung but unclear on the diagnosis ([facility] notes state that patient had respiratory failure s/p collapse of L lung)  - CT chest ordered for further evaluation  - Will place on zosyn for now  - check sputum culture ([facility] documents report patient with candida auris in her lungs), consider ID eval   - Will check proBNP, check troponin x2 (EKG without acute ischemic findings)  - Not on diuretics but seems like she was previously prescribed bumex as per [employer], will monitor off diuresis for now pending above lab work and CT, also check TTE ([facility] documents note patient with unspecified valvular regurg)  - Not on telemetry currently, as per daughter no c/o of chest pain, reconsider need for telemetry based on above w/u

## 2024-03-16 NOTE — H&P ADULT - HISTORY OF PRESENT ILLNESS
Please note, patient is uncooperative for the interview and physical exam despite frequent redirections and attempts to attempt to talk to the patent, she instead screams "I did not sleep all night, I want to sleep"    This is a 63F with documented history of CHFpEF, Pulmonary aspergillosis/candidiasis, AFib on Eliquis, DM2 with diabetic neuropathy, COPD on 3L NC, Bipolar/Mood disorder, CKD, HTN, HLD, GERD, and Anemia who presents to the hospital from [facility] for agitation. Patient states that she was hospitalized at [facility] from early December till end of February for a R leg infection and associated complications. Patient s/p R BKA (she reports 2 operations, one on 12/4 and other on 12/7). As per [facility] paperwork post-op patient had respiratory failure, heart failure, valvular heart disease, and was in the ICU for an indeterminant amount of time. Was eventually discharged to [facility].    Patient reports that she was unhappy with the [facility], said that they "treated her poorly", would not attend to her needs for multiple hours, would not wake her up for meals, would not help her with eating, and were not taking her for rehabilitation. Said that she was also placed on isolation for "something in her urine" and that she recently was started on medications for pneumonia. When asked about her behavior at the [facility] she said that she did yell, curse, and throw things at the staff at the [facility] because the would keep on bothering her. Spoke with [facility] staff briefly, said that the patient has been combative throughout her stay there for the past 2 weeks. Staff also said that the patient was seen by a psychiatrist at [facility] last week and was started on depakote for her bipolar d/o. Also spoke with patient's daughter who said that the patient at baseline is independent in her ADLs and lives alone. Said that the patient does have a history of bipolar d/o but has never been hospitalized for it and that the patient does not take any medications for her bipolar disorder.     On arrival to the hospital the patient's vitals were T 98.3, P 78, /65, RR 18, o2 sat 94% 3L NC -> 88% 4L NC -> 98% 4L NC. Her lab work here was notable for hyperkalemia but otherwise no acute changes from prior labs on HIE. She was given cefepime 1g, vancomycin 1g, NS 1L, and humulin R 10U + D50 and calcium gluc 2g for her hyperkalemia. She was admitted to medicine.

## 2024-03-16 NOTE — H&P ADULT - NSHPSOCIALHISTORY_GEN_ALL_CORE
Currently at [facility] after her prolonged hospitalization at [facility] and her R BKA, previously lived alone in her apartment  As per daughter -> Former tobacco user, has not smoked since Dec 2023  No known EtOH or illicit substance use as per daughter

## 2024-03-16 NOTE — PROVIDER CONTACT NOTE (CRITICAL VALUE NOTIFICATION) - ACTION/TREATMENT ORDERED:
Trend troponin and continue to monitor
Order Dextrose 50%, Humulin and calcium gluconate. CMP and blood gas venous and EKG

## 2024-03-16 NOTE — H&P ADULT - PROBLEM SELECTOR PLAN 1
- Patient with history of Bipolar disorder but seeming not on medications as per daughter, as per staff at her [facility] patient only recently started on depakote last week  - Here shows signs of disorganization (completely undressed in bed and refusing to put on her gown, alternating between yelling and crying, physically aggressive with staff at her [facility])  - As per daughter patient did not report SI or HI to her  - Will c/w her depakote for now but will need to consult psych for help in management of her likely decompensated psychiatric illness

## 2024-03-16 NOTE — H&P ADULT - PROBLEM SELECTOR PLAN 6
- Clarify need for diuresis based on above work up and if patient allows for examination to assess for fluid status

## 2024-03-16 NOTE — BH CONSULTATION LIAISON ASSESSMENT NOTE - NSBHATTESTCOMMENTATTENDFT_PSY_A_CORE
Chart reviewed, patient seen/evaluated virtually with Mckayla Lopez NP ,  I agree with above assessment/plan. Pt. was sleeping on my interview, did not wake up patient to prevent any further agitation. Plan as above. Will follow

## 2024-03-16 NOTE — H&P ADULT - NSHPPHYSICALEXAM_GEN_ALL_CORE
Vital Signs Last 24 Hrs  T(C): 36.4 (16 Mar 2024 00:05), Max: 36.8 (15 Mar 2024 16:04)  T(F): 97.6 (16 Mar 2024 00:05), Max: 98.3 (15 Mar 2024 16:04)  HR: 80 (16 Mar 2024 00:05) (77 - 80)  BP: 199/96 (16 Mar 2024 00:05) (130/50 - 199/96)  BP(mean): --  RR: 19 (16 Mar 2024 00:05) (18 - 19)  SpO2: 98% (15 Mar 2024 23:00) (88% - 98%)    Parameters below as of 16 Mar 2024 00:05  Patient On (Oxygen Delivery Method): nasal cannula  O2 Flow (L/min): 3    Patient refusing physical exam currently, noted to be laying in bed in NAD, undressed and refusing to put on her gown ("I am too hot"), labile affect alternating between crying and being very angry and screaming

## 2024-03-16 NOTE — H&P ADULT - PROBLEM SELECTOR PROBLEM 11
NEGRITO Baxter   Anemia

## 2024-03-16 NOTE — BH CONSULTATION LIAISON ASSESSMENT NOTE - CURRENT MEDICATION
MEDICATIONS  (STANDING):  albuterol/ipratropium for Nebulization 3 milliLiter(s) Nebulizer every 6 hours  aMIOdarone    Tablet 200 milliGRAM(s) Oral daily  apixaban 5 milliGRAM(s) Oral every 12 hours  budesonide 160 MICROgram(s)/formoterol 4.5 MICROgram(s) Inhaler 2 Puff(s) Inhalation two times a day  dextrose 5%. 1000 milliLiter(s) (100 mL/Hr) IV Continuous <Continuous>  dextrose 5%. 1000 milliLiter(s) (50 mL/Hr) IV Continuous <Continuous>  dextrose 50% Injectable 25 Gram(s) IV Push once  dextrose 50% Injectable 25 Gram(s) IV Push once  dextrose 50% Injectable 12.5 Gram(s) IV Push once  divalproex  milliGRAM(s) Oral daily  glucagon  Injectable 1 milliGRAM(s) IntraMuscular once  hydrALAZINE 50 milliGRAM(s) Oral every 8 hours  influenza  Vaccine (HIGH DOSE) 0.7 milliLiter(s) IntraMuscular once  insulin glargine Injectable (LANTUS) 18 Unit(s) SubCutaneous at bedtime  insulin lispro (ADMELOG) corrective regimen sliding scale   SubCutaneous three times a day before meals  insulin lispro (ADMELOG) corrective regimen sliding scale   SubCutaneous at bedtime  insulin lispro Injectable (ADMELOG) 6 Unit(s) SubCutaneous three times a day before meals  labetalol 300 milliGRAM(s) Oral every 8 hours  lidocaine   4% Patch 1 Patch Transdermal daily  NIFEdipine XL 30 milliGRAM(s) Oral daily  pantoprazole    Tablet 40 milliGRAM(s) Oral before breakfast  piperacillin/tazobactam IVPB.. 3.375 Gram(s) IV Intermittent every 8 hours  simvastatin 20 milliGRAM(s) Oral at bedtime    MEDICATIONS  (PRN):  acetaminophen     Tablet .. 650 milliGRAM(s) Oral every 6 hours PRN Temp greater or equal to 38C (100.4F), Mild Pain (1 - 3)  aluminum hydroxide/magnesium hydroxide/simethicone Suspension 30 milliLiter(s) Oral every 4 hours PRN Dyspepsia  dextrose Oral Gel 15 Gram(s) Oral once PRN Blood Glucose LESS THAN 70 milliGRAM(s)/deciliter  melatonin 3 milliGRAM(s) Oral at bedtime PRN Insomnia  ondansetron Injectable 4 milliGRAM(s) IV Push every 8 hours PRN Nausea and/or Vomiting

## 2024-03-16 NOTE — BH CONSULTATION LIAISON ASSESSMENT NOTE - NSBHCHARTREVIEWVS_PSY_A_CORE FT
Vital Signs Last 24 Hrs  T(C): 36.7 (16 Mar 2024 14:16), Max: 36.9 (16 Mar 2024 08:50)  T(F): 98.1 (16 Mar 2024 14:16), Max: 98.5 (16 Mar 2024 08:50)  HR: 82 (16 Mar 2024 14:16) (77 - 89)  BP: 180/82 (16 Mar 2024 14:16) (130/50 - 213/82)  BP(mean): --  RR: 18 (16 Mar 2024 14:16) (18 - 19)  SpO2: 97% (16 Mar 2024 14:16) (88% - 98%)    Parameters below as of 16 Mar 2024 14:16  Patient On (Oxygen Delivery Method): nasal cannula  O2 Flow (L/min): 3

## 2024-03-16 NOTE — H&P ADULT - PROBLEM SELECTOR PLAN 4
- On lantus 22U qHS and admelog 8U TID qAC  - Will place on lantus 18U 1qHS, admelog 6U TID qAC, low dose ISS, FS qAC, CC diet

## 2024-03-16 NOTE — BH CONSULTATION LIAISON ASSESSMENT NOTE - NSBHMSERELATED_PSY_A_CORE
Dr Barlow to review.    Holter monitor 8/21: 3 day Holter recording significant for atrial fibrillation, ventricular rate  bpm, average 91 bpm, no significant ventricular pauses, rare PVC's.   No symptomatic spells  
Per Dr Barlow review of holter, new order to start metoprolol 25 mg daily, decrease losartan to 50 mg daily, nurse visit in 1 week to check b/p and pulses, patient to take b/p and pulse at home daily    Writer called and updated patient on holter results and new orders from Dr Barlow, verbalizes understanding and agrees with poc    Nurse visit scheduled for 9/2 at 2pm. To call earlier with any changes, questions or concerns  
Poor

## 2024-03-16 NOTE — BH CONSULTATION LIAISON ASSESSMENT NOTE - NSBHATTESTAPPAMEND_PSY_A_CORE
I have personally seen and examined this patient. I fully participated in the care of this patient. I have made amendments to the documentation where appropriate and otherwise agree with the history, physical exam, and plan as documented by the LINA

## 2024-03-16 NOTE — BH CONSULTATION LIAISON ASSESSMENT NOTE - HPI (INCLUDE ILLNESS QUALITY, SEVERITY, DURATION, TIMING, CONTEXT, MODIFYING FACTORS, ASSOCIATED SIGNS AND SYMPTOMS)
63F with PMHx CHFpEF, Pulmonary aspergillosis/candidiasis, AFib, DM2 with diabetic neuropathy, COPD on 3L NC, CKD, HTN, HLD, GERD, Anemia, R BKA (post op respiratory failure, HF, valvular HD, ICU indeterminant amount of time), PPHx Bipolar dos who [facility] has been combative throughout her stay there for the past 2 weeks, seen by a psychiatrist last week and started on depakote, patient's daughter said that patient at baseline is independent in her ADLs and lives alone, does have a history of bipolar d/o but has never been hospitalized and that the patient does not take any medications for her bipolar disorder.     Patient seen, alert to self, irritable, combative, banging on side rail and demanding writer leave her bedside, therefore, interview limited. Nurse reports this has been patient's behavior while at [facility].

## 2024-03-16 NOTE — BH CONSULTATION LIAISON ASSESSMENT NOTE - SUMMARY
63F with PMHx CHFpEF, Pulmonary aspergillosis/candidiasis, AFib, DM2 with diabetic neuropathy, COPD on 3L NC, CKD, HTN, HLD, GERD, Anemia, R BKA (post op respiratory failure, HF, valvular HD, ICU indeterminant amount of time), PPHx Bipolar dos who presents to the hospital from [facility] for agitation-[facility] said patient has been combative throughout her stay there for the past 2 weeks, seen by a psychiatrist last week and started on depakote, patient's daughter said that patient at baseline is independent in her ADLs and lives alone, does have a history of bipolar d/o but has never been hospitalized and that the patient does not take any medications for her bipolar disorder.     Patient seen, alert to self, irritable, combative, banging on side rail and demanding writer leave her bedside, therefore, interview limited. Nurse reports this has been patient's behavior while at [facility].     PLAN:  -defer obs status to primary team  -Obtain EKG and monitor qtc interval  -Increase Depakote to 250mg bid-if refuses give via IV infusion  -Agitation/Aggression: Zyprexa 1.25mg q6h prn for refractory agitation can give additional Zyprexa 1.25mg, do not give Ativan iv/im within 1 hour of Zyprexa im d/t risk of sedation and or respiratory depression  -c/w Melatonin 3mg prn insomnia  -CL to follow  63F with PMHx CHFpEF, Pulmonary aspergillosis/candidiasis, AFib, DM2 with diabetic neuropathy, COPD on 3L NC, CKD, HTN, HLD, GERD, Anemia, R BKA (post op respiratory failure, HF, valvular HD, ICU indeterminant amount of time), PPHx Bipolar dos who presents to the hospital from [facility] for agitation-[facility] said patient has been combative throughout her stay there for the past 2 weeks, seen by a psychiatrist last week and started on depakote, patient's daughter said that patient at baseline is independent in her ADLs and lives alone, does have a history of bipolar d/o but has never been hospitalized and that the patient does not take any medications for her bipolar disorder.     Patient seen, alert to self, irritable, combative, banging on side rail and demanding writer leave her bedside, therefore, interview limited. Nurse reports this has been patient's behavior while at [facility].     PLAN:  -defer obs status to primary team  -Obtain EKG and monitor qtc interval  -Increase Depakote to 250mg bid-if refuses give via IV   -Agitation/Aggression: Zyprexa 1.25mg q6h prn for refractory agitation can give additional Zyprexa 1.25mg, do not give Ativan iv/im within 1 hour of Zyprexa im d/t risk of sedation and or respiratory depression  -c/w Melatonin 3mg prn insomnia  -CL to follow

## 2024-03-16 NOTE — H&P ADULT - PROBLEM SELECTOR PLAN 11
- Chronic, stable based on prior labs on HIE  - Assess for bleeding complaints from patient if she is willing to answer questions later on, monitor her H/H for now likely daily

## 2024-03-16 NOTE — H&P ADULT - NSHPREVIEWOFSYSTEMS_GEN_ALL_CORE
Patient refusing to participate in the interview process, screaming "I did not sleep all night, I want to sleep, I will not answer your questions"

## 2024-03-16 NOTE — H&P ADULT - NSHPLABSRESULTS_GEN_ALL_CORE
LABS and ADDITIONAL STUDIES:                        8.1    9.06  )-----------( 423      ( 15 Mar 2024 18:07 )             25.5     136  |  101  |  55<H>  ----------------------------<  204<H>     03-16  5.4<H>   |  26  |  1.06    Ca    8.9      16 Mar 2024 03:22  Phos  4.9     03-16  Mg     3.10     03-16    TPro  6.6  /  Alb  3.7  /  TBili  <0.2  /  DBili  x   /  AST  32  /  ALT  40<H>  /  AlkPhos  114  03-16    03:22 - VBG - pH: 7.32  | pCO2: 57    | pO2: 48    | Lactate: 0.7    22:30 - VBG - pH: 7.33  | pCO2: 56    | pO2: 44    | Lactate: 1.4      < from: Xray Chest 1 View AP/PA (03.15.24 @ 20:31) >  FINDINGS:  Moderate bilateral pleural effusions. No pneumothorax.  The heart size is not accurately assessed in this projection..  No acute osseous abnormalities.    IMPRESSION:  Moderate bilateral pleural effusions. Underlying pneumonia cannot be excluded.  --- End of Report ---  < end of copied text >    EKG - NSR at 81, QTc 441, no significant ST-T wave changes

## 2024-03-17 DIAGNOSIS — J18.9 PNEUMONIA, UNSPECIFIED ORGANISM: ICD-10-CM

## 2024-03-17 LAB
CULTURE RESULTS: SIGNIFICANT CHANGE UP
GLUCOSE BLDC GLUCOMTR-MCNC: 107 MG/DL — HIGH (ref 70–99)
GLUCOSE BLDC GLUCOMTR-MCNC: 153 MG/DL — HIGH (ref 70–99)
GLUCOSE BLDC GLUCOMTR-MCNC: 158 MG/DL — HIGH (ref 70–99)
GLUCOSE BLDC GLUCOMTR-MCNC: 177 MG/DL — HIGH (ref 70–99)
SPECIMEN SOURCE: SIGNIFICANT CHANGE UP

## 2024-03-17 PROCEDURE — 99233 SBSQ HOSP IP/OBS HIGH 50: CPT

## 2024-03-17 RX ORDER — FUROSEMIDE 40 MG
40 TABLET ORAL
Refills: 0 | Status: DISCONTINUED | OUTPATIENT
Start: 2024-03-17 | End: 2024-03-20

## 2024-03-17 RX ADMIN — Medication 300 MILLIGRAM(S): at 05:09

## 2024-03-17 RX ADMIN — DIVALPROEX SODIUM 250 MILLIGRAM(S): 500 TABLET, DELAYED RELEASE ORAL at 18:46

## 2024-03-17 RX ADMIN — BUDESONIDE AND FORMOTEROL FUMARATE DIHYDRATE 2 PUFF(S): 160; 4.5 AEROSOL RESPIRATORY (INHALATION) at 08:45

## 2024-03-17 RX ADMIN — INSULIN GLARGINE 18 UNIT(S): 100 INJECTION, SOLUTION SUBCUTANEOUS at 21:28

## 2024-03-17 RX ADMIN — PIPERACILLIN AND TAZOBACTAM 25 GRAM(S): 4; .5 INJECTION, POWDER, LYOPHILIZED, FOR SOLUTION INTRAVENOUS at 21:29

## 2024-03-17 RX ADMIN — Medication 50 MILLIGRAM(S): at 05:09

## 2024-03-17 RX ADMIN — APIXABAN 5 MILLIGRAM(S): 2.5 TABLET, FILM COATED ORAL at 18:46

## 2024-03-17 RX ADMIN — AMIODARONE HYDROCHLORIDE 200 MILLIGRAM(S): 400 TABLET ORAL at 05:09

## 2024-03-17 RX ADMIN — Medication 50 MILLIGRAM(S): at 14:32

## 2024-03-17 RX ADMIN — Medication 650 MILLIGRAM(S): at 05:35

## 2024-03-17 RX ADMIN — LIDOCAINE 1 PATCH: 4 CREAM TOPICAL at 12:44

## 2024-03-17 RX ADMIN — Medication 6 UNIT(S): at 12:42

## 2024-03-17 RX ADMIN — OLANZAPINE 1.25 MILLIGRAM(S): 15 TABLET, FILM COATED ORAL at 22:28

## 2024-03-17 RX ADMIN — Medication 6 UNIT(S): at 08:46

## 2024-03-17 RX ADMIN — DIVALPROEX SODIUM 250 MILLIGRAM(S): 500 TABLET, DELAYED RELEASE ORAL at 05:09

## 2024-03-17 RX ADMIN — Medication 3 MILLIGRAM(S): at 22:27

## 2024-03-17 RX ADMIN — Medication 40 MILLIGRAM(S): at 14:32

## 2024-03-17 RX ADMIN — Medication 1: at 08:47

## 2024-03-17 RX ADMIN — LIDOCAINE 1 PATCH: 4 CREAM TOPICAL at 19:34

## 2024-03-17 RX ADMIN — Medication 1: at 18:46

## 2024-03-17 RX ADMIN — PIPERACILLIN AND TAZOBACTAM 25 GRAM(S): 4; .5 INJECTION, POWDER, LYOPHILIZED, FOR SOLUTION INTRAVENOUS at 05:08

## 2024-03-17 RX ADMIN — PANTOPRAZOLE SODIUM 40 MILLIGRAM(S): 20 TABLET, DELAYED RELEASE ORAL at 05:09

## 2024-03-17 RX ADMIN — Medication 300 MILLIGRAM(S): at 14:33

## 2024-03-17 RX ADMIN — PIPERACILLIN AND TAZOBACTAM 25 GRAM(S): 4; .5 INJECTION, POWDER, LYOPHILIZED, FOR SOLUTION INTRAVENOUS at 14:33

## 2024-03-17 RX ADMIN — APIXABAN 5 MILLIGRAM(S): 2.5 TABLET, FILM COATED ORAL at 05:09

## 2024-03-17 RX ADMIN — Medication 30 MILLIGRAM(S): at 08:48

## 2024-03-18 LAB
GLUCOSE BLDC GLUCOMTR-MCNC: 132 MG/DL — HIGH (ref 70–99)
GLUCOSE BLDC GLUCOMTR-MCNC: 140 MG/DL — HIGH (ref 70–99)
GLUCOSE BLDC GLUCOMTR-MCNC: 151 MG/DL — HIGH (ref 70–99)
GLUCOSE BLDC GLUCOMTR-MCNC: 183 MG/DL — HIGH (ref 70–99)
GLUCOSE BLDC GLUCOMTR-MCNC: 190 MG/DL — HIGH (ref 70–99)
GLUCOSE BLDC GLUCOMTR-MCNC: 238 MG/DL — HIGH (ref 70–99)

## 2024-03-18 PROCEDURE — 99232 SBSQ HOSP IP/OBS MODERATE 35: CPT

## 2024-03-18 PROCEDURE — 93010 ELECTROCARDIOGRAM REPORT: CPT

## 2024-03-18 RX ORDER — QUETIAPINE FUMARATE 200 MG/1
50 TABLET, FILM COATED ORAL EVERY 6 HOURS
Refills: 0 | Status: DISCONTINUED | OUTPATIENT
Start: 2024-03-18 | End: 2024-03-21

## 2024-03-18 RX ADMIN — Medication 300 MILLIGRAM(S): at 21:46

## 2024-03-18 RX ADMIN — Medication 2: at 14:44

## 2024-03-18 RX ADMIN — Medication 3 MILLIGRAM(S): at 21:46

## 2024-03-18 RX ADMIN — OLANZAPINE 1.25 MILLIGRAM(S): 15 TABLET, FILM COATED ORAL at 21:45

## 2024-03-18 RX ADMIN — BUDESONIDE AND FORMOTEROL FUMARATE DIHYDRATE 2 PUFF(S): 160; 4.5 AEROSOL RESPIRATORY (INHALATION) at 21:45

## 2024-03-18 RX ADMIN — Medication 40 MILLIGRAM(S): at 14:35

## 2024-03-18 RX ADMIN — PIPERACILLIN AND TAZOBACTAM 25 GRAM(S): 4; .5 INJECTION, POWDER, LYOPHILIZED, FOR SOLUTION INTRAVENOUS at 14:38

## 2024-03-18 RX ADMIN — QUETIAPINE FUMARATE 50 MILLIGRAM(S): 200 TABLET, FILM COATED ORAL at 21:46

## 2024-03-18 RX ADMIN — Medication 50 MILLIGRAM(S): at 21:46

## 2024-03-18 RX ADMIN — Medication 6 UNIT(S): at 18:19

## 2024-03-18 RX ADMIN — Medication 3 MILLILITER(S): at 21:05

## 2024-03-18 RX ADMIN — APIXABAN 5 MILLIGRAM(S): 2.5 TABLET, FILM COATED ORAL at 17:28

## 2024-03-18 RX ADMIN — Medication 6 UNIT(S): at 13:37

## 2024-03-18 RX ADMIN — INSULIN GLARGINE 18 UNIT(S): 100 INJECTION, SOLUTION SUBCUTANEOUS at 21:46

## 2024-03-18 RX ADMIN — Medication 50 MILLIGRAM(S): at 14:33

## 2024-03-18 RX ADMIN — PIPERACILLIN AND TAZOBACTAM 25 GRAM(S): 4; .5 INJECTION, POWDER, LYOPHILIZED, FOR SOLUTION INTRAVENOUS at 06:44

## 2024-03-18 RX ADMIN — Medication 6 UNIT(S): at 09:17

## 2024-03-18 RX ADMIN — SIMVASTATIN 20 MILLIGRAM(S): 20 TABLET, FILM COATED ORAL at 21:46

## 2024-03-18 RX ADMIN — LIDOCAINE 1 PATCH: 4 CREAM TOPICAL at 00:28

## 2024-03-18 RX ADMIN — DIVALPROEX SODIUM 250 MILLIGRAM(S): 500 TABLET, DELAYED RELEASE ORAL at 17:29

## 2024-03-18 RX ADMIN — Medication 300 MILLIGRAM(S): at 14:34

## 2024-03-18 RX ADMIN — LIDOCAINE 1 PATCH: 4 CREAM TOPICAL at 14:32

## 2024-03-18 RX ADMIN — PIPERACILLIN AND TAZOBACTAM 25 GRAM(S): 4; .5 INJECTION, POWDER, LYOPHILIZED, FOR SOLUTION INTRAVENOUS at 21:47

## 2024-03-18 NOTE — PHYSICAL THERAPY INITIAL EVALUATION ADULT - PERTINENT HX OF CURRENT PROBLEM, REHAB EVAL
63 year old female who presents to the hospital for agitation at her [facility] with concern for decompensated psychiatric illness.

## 2024-03-18 NOTE — BH CONSULTATION LIAISON PROGRESS NOTE - NSBHASSESSMENTFT_PSY_ALL_CORE
63F with documented history of CHFpEF, Pulmonary aspergillosis/candidiasis, AFib on Eliquis, DM2 with diabetic neuropathy, COPD on 3L NC,  CKD, HTN, HLD, GERD, and Anemia, PPH of diagnosis of Bipolar/Mood disorder, not in outpatient treatment, no prior admissions, no history of suicide attempt, who presents to the hospital from [facility] for agitation. Patient states that she was hospitalized at [facility] from early December till end of February for a R leg infection and associated complications. Patient s/p R BKA (she reports 2 operations, one on 12/4 and other on 12/7). As per [facility] paperwork post-op patient had respiratory failure, heart failure, valvular heart disease, and was in the ICU for an indeterminant amount of time. Was eventually discharged to [facility], transferred to [facility] for agitation at the [facility]. A psychiatrist at the [facility] started her on depakote for agitation, increased by covering CL team over the weekend to 250mg BID.     Patient reports that she was unhappy with the [facility], said that they "treated her poorly", would not attend to her needs for multiple hours, would not wake her up for meals, would not help her with eating, and were not taking her for rehabilitation.     On assessment she is irritable, ruminative about her treatment at the [facility], despondent about the state of her physical health. Denies any acute safety concerns. No clear history of michael, suspect bipolar disorder diagnosis was incorrect. More likely patient has depression, delirium, personality factors including irritability that put her at risk for increased agitation in the hospital and rehab setting.    No indication to increase depakote at this time.    Plan:  - continue with depakote 250mg BID for agitation  - melatonin 3mg qhs  - seroquel 50mg q6h PRN for insomnia/anxiety  - ativan 2mg PO/IM for acute agitation  - routine obs  - delirium precautions

## 2024-03-18 NOTE — PHYSICAL THERAPY INITIAL EVALUATION ADULT - ADDITIONAL COMMENTS
Patient is an inconsistent historian. Patient reports living in an apartment with her daughter with an elevator to enter. Reports no prior use of assistive device and independence with ADLs Patient is an inconsistent historian. Patient presents on this admission from rehab but prior Patient reports living in an apartment with her daughter with an elevator to enter. Reports no prior use of assistive device and independence with ADLs. Please see care coordination note for further details.

## 2024-03-19 LAB
GLUCOSE BLDC GLUCOMTR-MCNC: 262 MG/DL — HIGH (ref 70–99)
GLUCOSE BLDC GLUCOMTR-MCNC: 327 MG/DL — HIGH (ref 70–99)

## 2024-03-19 PROCEDURE — 99232 SBSQ HOSP IP/OBS MODERATE 35: CPT

## 2024-03-19 PROCEDURE — 93306 TTE W/DOPPLER COMPLETE: CPT | Mod: 26

## 2024-03-19 RX ADMIN — Medication 30 MILLIGRAM(S): at 05:54

## 2024-03-19 RX ADMIN — OLANZAPINE 1.25 MILLIGRAM(S): 15 TABLET, FILM COATED ORAL at 13:33

## 2024-03-19 RX ADMIN — LIDOCAINE 1 PATCH: 4 CREAM TOPICAL at 02:00

## 2024-03-19 RX ADMIN — Medication 40 MILLIGRAM(S): at 05:53

## 2024-03-19 RX ADMIN — Medication 50 MILLIGRAM(S): at 05:53

## 2024-03-19 RX ADMIN — Medication 3 MILLIGRAM(S): at 22:36

## 2024-03-19 RX ADMIN — DIVALPROEX SODIUM 250 MILLIGRAM(S): 500 TABLET, DELAYED RELEASE ORAL at 22:17

## 2024-03-19 RX ADMIN — APIXABAN 5 MILLIGRAM(S): 2.5 TABLET, FILM COATED ORAL at 05:54

## 2024-03-19 RX ADMIN — DIVALPROEX SODIUM 250 MILLIGRAM(S): 500 TABLET, DELAYED RELEASE ORAL at 05:54

## 2024-03-19 RX ADMIN — BUDESONIDE AND FORMOTEROL FUMARATE DIHYDRATE 2 PUFF(S): 160; 4.5 AEROSOL RESPIRATORY (INHALATION) at 22:15

## 2024-03-19 RX ADMIN — Medication 2: at 22:14

## 2024-03-19 RX ADMIN — AMIODARONE HYDROCHLORIDE 200 MILLIGRAM(S): 400 TABLET ORAL at 05:54

## 2024-03-19 RX ADMIN — Medication 3 MILLILITER(S): at 03:47

## 2024-03-19 RX ADMIN — Medication 6 UNIT(S): at 17:43

## 2024-03-19 RX ADMIN — Medication 3 MILLILITER(S): at 19:56

## 2024-03-19 RX ADMIN — Medication 300 MILLIGRAM(S): at 05:54

## 2024-03-19 RX ADMIN — INSULIN GLARGINE 18 UNIT(S): 100 INJECTION, SOLUTION SUBCUTANEOUS at 22:14

## 2024-03-19 RX ADMIN — PIPERACILLIN AND TAZOBACTAM 25 GRAM(S): 4; .5 INJECTION, POWDER, LYOPHILIZED, FOR SOLUTION INTRAVENOUS at 22:14

## 2024-03-19 RX ADMIN — OLANZAPINE 1.25 MILLIGRAM(S): 15 TABLET, FILM COATED ORAL at 05:38

## 2024-03-19 RX ADMIN — Medication 300 MILLIGRAM(S): at 22:15

## 2024-03-19 RX ADMIN — PANTOPRAZOLE SODIUM 40 MILLIGRAM(S): 20 TABLET, DELAYED RELEASE ORAL at 05:55

## 2024-03-19 RX ADMIN — Medication 50 MILLIGRAM(S): at 22:15

## 2024-03-19 RX ADMIN — Medication 3: at 17:44

## 2024-03-19 RX ADMIN — QUETIAPINE FUMARATE 50 MILLIGRAM(S): 200 TABLET, FILM COATED ORAL at 05:38

## 2024-03-19 RX ADMIN — SIMVASTATIN 20 MILLIGRAM(S): 20 TABLET, FILM COATED ORAL at 22:36

## 2024-03-19 NOTE — PROVIDER CONTACT NOTE (OTHER) - RECOMMENDATIONS
Provider to come assess pt and place pt on tele unit.
Give BP meds; recheck BP in an hour
Patient refusing all medications and fingersticks. Can not monitor blood glucose and can not give insulin. Patient refusing blood pressure and psych medications.  Provider notifed.

## 2024-03-20 LAB
ANION GAP SERPL CALC-SCNC: 10 MMOL/L — SIGNIFICANT CHANGE UP (ref 7–14)
BUN SERPL-MCNC: 55 MG/DL — HIGH (ref 7–23)
CALCIUM SERPL-MCNC: 8.4 MG/DL — SIGNIFICANT CHANGE UP (ref 8.4–10.5)
CHLORIDE SERPL-SCNC: 105 MMOL/L — SIGNIFICANT CHANGE UP (ref 98–107)
CO2 SERPL-SCNC: 29 MMOL/L — SIGNIFICANT CHANGE UP (ref 22–31)
CREAT SERPL-MCNC: 1.59 MG/DL — HIGH (ref 0.5–1.3)
EGFR: 36 ML/MIN/1.73M2 — LOW
GLUCOSE BLDC GLUCOMTR-MCNC: 147 MG/DL — HIGH (ref 70–99)
GLUCOSE BLDC GLUCOMTR-MCNC: 162 MG/DL — HIGH (ref 70–99)
GLUCOSE SERPL-MCNC: 213 MG/DL — HIGH (ref 70–99)
HCT VFR BLD CALC: 23.6 % — LOW (ref 34.5–45)
HGB BLD-MCNC: 7.5 G/DL — LOW (ref 11.5–15.5)
MAGNESIUM SERPL-MCNC: 2.9 MG/DL — HIGH (ref 1.6–2.6)
MCHC RBC-ENTMCNC: 29.5 PG — SIGNIFICANT CHANGE UP (ref 27–34)
MCHC RBC-ENTMCNC: 31.8 GM/DL — LOW (ref 32–36)
MCV RBC AUTO: 92.9 FL — SIGNIFICANT CHANGE UP (ref 80–100)
NRBC # BLD: 0 /100 WBCS — SIGNIFICANT CHANGE UP (ref 0–0)
NRBC # FLD: 0 K/UL — SIGNIFICANT CHANGE UP (ref 0–0)
PHOSPHATE SERPL-MCNC: 5.2 MG/DL — HIGH (ref 2.5–4.5)
PLATELET # BLD AUTO: 370 K/UL — SIGNIFICANT CHANGE UP (ref 150–400)
POTASSIUM SERPL-MCNC: 5.1 MMOL/L — SIGNIFICANT CHANGE UP (ref 3.5–5.3)
POTASSIUM SERPL-SCNC: 5.1 MMOL/L — SIGNIFICANT CHANGE UP (ref 3.5–5.3)
RBC # BLD: 2.54 M/UL — LOW (ref 3.8–5.2)
RBC # FLD: 16.5 % — HIGH (ref 10.3–14.5)
SODIUM SERPL-SCNC: 144 MMOL/L — SIGNIFICANT CHANGE UP (ref 135–145)
WBC # BLD: 8.98 K/UL — SIGNIFICANT CHANGE UP (ref 3.8–10.5)
WBC # FLD AUTO: 8.98 K/UL — SIGNIFICANT CHANGE UP (ref 3.8–10.5)

## 2024-03-20 PROCEDURE — 99232 SBSQ HOSP IP/OBS MODERATE 35: CPT

## 2024-03-20 RX ORDER — FUROSEMIDE 40 MG
40 TABLET ORAL DAILY
Refills: 0 | Status: DISCONTINUED | OUTPATIENT
Start: 2024-03-20 | End: 2024-03-21

## 2024-03-20 RX ADMIN — Medication 40 MILLIGRAM(S): at 06:18

## 2024-03-20 RX ADMIN — Medication 3 MILLILITER(S): at 21:31

## 2024-03-20 RX ADMIN — INSULIN GLARGINE 18 UNIT(S): 100 INJECTION, SOLUTION SUBCUTANEOUS at 21:51

## 2024-03-20 RX ADMIN — Medication 3 MILLIGRAM(S): at 21:49

## 2024-03-20 RX ADMIN — BUDESONIDE AND FORMOTEROL FUMARATE DIHYDRATE 2 PUFF(S): 160; 4.5 AEROSOL RESPIRATORY (INHALATION) at 21:51

## 2024-03-20 RX ADMIN — Medication 6 UNIT(S): at 17:50

## 2024-03-20 RX ADMIN — SIMVASTATIN 20 MILLIGRAM(S): 20 TABLET, FILM COATED ORAL at 21:49

## 2024-03-20 RX ADMIN — Medication 50 MILLIGRAM(S): at 21:50

## 2024-03-20 RX ADMIN — PIPERACILLIN AND TAZOBACTAM 25 GRAM(S): 4; .5 INJECTION, POWDER, LYOPHILIZED, FOR SOLUTION INTRAVENOUS at 06:18

## 2024-03-20 RX ADMIN — Medication 300 MILLIGRAM(S): at 21:49

## 2024-03-20 NOTE — PROGRESS NOTE ADULT - PROBLEM SELECTOR PLAN 9
- Hypertensive urgency, agitation likely contributing   - c/w Labetalol, Hydralazine, Nifedipine   - up-titrate BP meds if needed   - management of agitation as above   - monitor BP

## 2024-03-20 NOTE — PROGRESS NOTE ADULT - PROBLEM SELECTOR PROBLEM 7
Unspecified atrial fibrillation
Chronic diastolic heart failure

## 2024-03-20 NOTE — PROGRESS NOTE ADULT - PROBLEM SELECTOR PLAN 2
- Has b/l pleural effusions on CXR, likely acute on chronic HFpEF   - Daughter states that the patient had fluid drained from her lung but unclear on the diagnosis ([facility] notes state that patient had respiratory failure s/p collapse of L lung)  - CT chest w/ interstitial pulmonary edema, moderate right and small left pleural effusions  - proBNP 8356  - check TTE mod/severe MR, preserved EF, will schedule cards outpatient   -  Lasix 40mg IV q12h   - strict I/O's, daily weights
- Has b/l pleural effusions on CXR, likely acute on chronic HFpEF   - Daughter states that the patient had fluid drained from her lung but unclear on the diagnosis ([facility] notes state that patient had respiratory failure s/p collapse of L lung)  - CT chest w/ interstitial pulmonary edema, moderate right and small left pleural effusions  - proBNP 8356  - check TTE   - transfer to telemetry   - start Lasix 40mg IV q12h   - strict I/O's, daily weights
- Has b/l pleural effusions on CXR, likely acute on chronic HFpEF   - Daughter states that the patient had fluid drained from her lung but unclear on the diagnosis ([facility] notes state that patient had respiratory failure s/p collapse of L lung)  - CT chest w/ interstitial pulmonary edema, moderate right and small left pleural effusions  - proBNP 8356  - check TTE pending  - transfer to telemetry   - start Lasix 40mg IV q12h   - strict I/O's, daily weights
- Has b/l pleural effusions on CXR, likely acute on chronic HFpEF   - Daughter states that the patient had fluid drained from her lung but unclear on the diagnosis ([facility] notes state that patient had respiratory failure s/p collapse of L lung)  - CT chest w/ interstitial pulmonary edema, moderate right and small left pleural effusions  - proBNP 8356  - check TTE pending  - transfer to telemetry   - start Lasix 40mg IV q12h   - strict I/O's, daily weights
- Has b/l pleural effusions on CXR with history of CHFpEF and recent cough (was placed on augmentin at her [facility])   - Daughter states that the patient had fluid drained from her lung but unclear on the diagnosis ([facility] notes state that patient had respiratory failure s/p collapse of L lung)  - CT chest ordered for further evaluation  - Will c/w empiric zosyn for now  - check sputum culture ([facility] documents report patient with candida auris in her lungs)   - proBNP 8356  - check TTE

## 2024-03-20 NOTE — PROGRESS NOTE ADULT - PROBLEM SELECTOR PROBLEM 4
Stage 3 chronic kidney disease
Elevated troponin

## 2024-03-20 NOTE — PROGRESS NOTE ADULT - PROBLEM SELECTOR PLAN 4
- Seems to have CKD2-3 on review of labs on HIE  - Here with new hyperkalemia s/p medical management, EKG without concerning changes 2/2 hyperkalemia  - monitor BMP
- likely d/t demand ischemia   - trop 116-->112-->104  - check TTE

## 2024-03-20 NOTE — PROGRESS NOTE ADULT - PROBLEM SELECTOR PLAN 6
- On lantus 22U qHS and admelog 8U TID qAC  - Hgb A1c 6.1   - Will c/w lantus 18U 1qHS, admelog 6U TID qAC  - low dose ISS  - monitor fingersticks   - CC diet
- On lantus 22U qHS and admelog 8U TID qAC  - Hgb A1c 6.1   - Will c/w lantus 18U 1qHS, admelog 6U TID qAC  - low dose ISS  - monitor fingersticks   - CC diet
- c/w eliquis  - c/w amidarone
- On lantus 22U qHS and admelog 8U TID qAC  - Hgb A1c 6.1   - Will c/w lantus 18U 1qHS, admelog 6U TID qAC  - low dose ISS  - monitor fingersticks   - CC diet
- On lantus 22U qHS and admelog 8U TID qAC  - Hgb A1c 6.1   - Will c/w lantus 18U 1qHS, admelog 6U TID qAC  - low dose ISS  - monitor fingersticks   - CC diet

## 2024-03-20 NOTE — PROGRESS NOTE ADULT - PROBLEM SELECTOR PLAN 11
- Chronic, stable based on prior labs on HIE  - Monitor CBC
DVT ppx - Eliquis  Diet - On regular texture and thin consistency diet at [facility] -> will place on regular DASH/CC 1.5L fluid restriction diet here  Activity - OOB with assistance, increase as tolerated    Fall and aspiration precautions
DVT ppx - Eliquis  Diet - On regular texture and thin consistency diet at [facility] -> will place on regular DASH/CC 1.5L fluid restriction diet here  Activity - OOB with assistance, increase as tolerated    Fall and aspiration precautions
DVT ppx - Eliquis  Diet - On regular texture and thin consistency diet at [facility] -> will place on regular DASH/CC 1.5L fluid restriction diet here  Activity - OOB with assistance, increase as tolerated    Fall and aspiration precautions
DVT ppx - Eliquis  Diet - On regular texture and thin consistency diet at [facility] -> will place on regular DASH/CC 1.5L fluid restriction diet here  Activity - OOB with assistance, increase as tolerated    Fall and aspiration precautions

## 2024-03-20 NOTE — BH CONSULTATION LIAISON PROGRESS NOTE - NSBHMSETHTPROC_PSY_A_CORE
Linear/Circumstantial/Perseverative/Impaired reasoning
Linear/Circumstantial/Perseverative/Impaired reasoning

## 2024-03-20 NOTE — PROGRESS NOTE ADULT - PROBLEM SELECTOR PLAN 8
- chronic hypoxic respiratory failure d/t COPD, on 3L home O2   - c/w supplemental O2   - c/w Symbicort   - clark q6h

## 2024-03-20 NOTE — PROGRESS NOTE ADULT - PROBLEM SELECTOR PROBLEM 6
Unspecified atrial fibrillation
Type 2 diabetes mellitus with diabetic neuropathy

## 2024-03-20 NOTE — PROGRESS NOTE ADULT - PROBLEM SELECTOR PLAN 1
- Patient with history of Bipolar disorder but seeming not on medications as per daughter, as per staff at her [facility] patient only recently started on depakote last week  - Here shows signs of disorganization (completely undressed in bed and refusing to put on her gown, alternating between yelling and crying, physically aggressive with staff at her [facility])  - increase depakote to 250mg BID   - Zyprexa 1.25mg q6h prn agitation   - Psych input appreciated
- Patient with history of Bipolar disorder but seeming not on medications as per daughter, as per staff at her [facility] patient only recently started on depakote last week  - Here shows signs of disorganization (completely undressed in bed and refusing to put on her gown, alternating between yelling and crying, physically aggressive with staff at her [facility])  - enhanced supervision   - increased depakote to 250mg BID   - Zyprexa 1.25mg q6h prn agitation   - Psych input appreciated

## 2024-03-20 NOTE — BH CONSULTATION LIAISON PROGRESS NOTE - NSBHATTESTCOMMENTATTENDFT_PSY_A_CORE
Patient seen this late AM. She is irritable with me though redirectable. More concerned about her treatment both here in the hospital and at the [facility]. Denies SI/HI. No AH/VH elicited. Mostly linear, though at times perseverative and tangential. Can be loud.  Unclear if she truly has a bipolar diagnosis, and would not elaborate with me.    At this point in time, agree with the above recommendations. Do not believe she would benefit from inpatient psychiatry.  Would encourage team see the patient as a unit to avoid splitting.  Please monitor platelets, LFTs, valproic acid level and albumin now that she is on depakote.
I saw and examined the patient this afternoon. Interview was limited as patient very irritable. She did state that she will take her medication but was focused on the food that she did not like. Asked me to leave and conclude interview.  She did not appear internally preoccupied. She was loud but not hyperverbal or pressured.    Agree with increasing depakote as per above.   Please check hepatic panel and ammonia (ensure ammonia lab put on ice and processed expeditiously)with AM labs tomorrow.

## 2024-03-20 NOTE — BH CONSULTATION LIAISON PROGRESS NOTE - NSBHASSESSMENTFT_PSY_ALL_CORE
63F with documented history of CHFpEF, Pulmonary aspergillosis/candidiasis, AFib on Eliquis, DM2 with diabetic neuropathy, COPD on 3L NC,  CKD, HTN, HLD, GERD, and Anemia, PPH of diagnosis of Bipolar/Mood disorder, not in outpatient treatment, no prior admissions, no history of suicide attempt, who presents to the hospital from [facility] for agitation. Patient states that she was hospitalized at [facility] from early December till end of February for a R leg infection and associated complications. Patient s/p R BKA (she reports 2 operations, one on 12/4 and other on 12/7). As per [facility] paperwork post-op patient had respiratory failure, heart failure, valvular heart disease, and was in the ICU for an indeterminant amount of time. Was eventually discharged to [facility], transferred to [facility] for agitation at the [facility]. A psychiatrist at the [facility] started her on depakote for agitation, increased by covering CL team over the weekend to 250mg BID.     Patient reports that she was unhappy with the [facility], said that they "treated her poorly", would not attend to her needs for multiple hours, would not wake her up for meals, would not help her with eating, and were not taking her for rehabilitation.     On assessment she is irritable, ruminative about her treatment at the [facility], despondent about the state of her physical health. Denies any acute safety concerns. No clear history of michael, suspect bipolar disorder diagnosis was incorrect. More likely patient has depression, delirium, personality factors including irritability that put her at risk for increased agitation in the hospital and rehab setting.    Patient is extremely irritable, hostile, reports feeling neglected and confused. Demanding of staff, condescending. However, sleep and appetite are good and patient's speech is not pressured, largely linear. Suspect current presentation is largely due to personality pathology with immature defenses acutely exacerbated in the setting of severe medical illness, loss of autonomy, loss of her leg, prolonged hospital course, and other perceived injustices. Patient is engaging in displacement, splitting, somatization, and other primitive defenses. She is easily agitated. Low suspicion for michael at this time, however would benefit from increasing her depakote to target impulsivity and agitation. Patient appears to be motivated to return home, much is her frustration is related to her lack of autonomy in the hospital setting. As such, she is unlikely to benefit from inpatient psychiatric hospitalization and she denies any acute safety concerns.     Plan:  - INCREASE depakote 250mg TID for agitation, monitor platelets, LFTs, ammonia, VPA level  - melatonin 3mg qhs  - seroquel 50mg q6h PRN for insomnia/anxiety  - ativan 2mg PO/IM for acute agitation  - routine obs  - delirium precautions  - will continue to attempt to contact daughter for collateral, but at this time there is low suspicion for primary mood disorder  - no role for inpatient psychiatry at this time, patient can return to [facility] when medically cleared  63F with documented history of CHFpEF, Pulmonary aspergillosis/candidiasis, AFib on Eliquis, DM2 with diabetic neuropathy, COPD on 3L NC,  CKD, HTN, HLD, GERD, and Anemia, PPH of diagnosis of Bipolar/Mood disorder, not in outpatient treatment, no prior admissions, no history of suicide attempt, who presents to the hospital from [facility] for agitation. Patient states that she was hospitalized at [facility] from early December till end of February for a R leg infection and associated complications. Patient s/p R BKA (she reports 2 operations, one on 12/4 and other on 12/7). As per [facility] paperwork post-op patient had respiratory failure, heart failure, valvular heart disease, and was in the ICU for an indeterminant amount of time. Was eventually discharged to [facility], transferred to [facility] for agitation at the [facility]. A psychiatrist at the [facility] started her on depakote for agitation, increased by covering CL team over the weekend to 250mg BID.     Patient reports that she was unhappy with the [facility], said that they "treated her poorly", would not attend to her needs for multiple hours, would not wake her up for meals, would not help her with eating, and were not taking her for rehabilitation.     On assessment she is irritable, ruminative about her treatment at the [facility], despondent about the state of her physical health. Denies any acute safety concerns. No clear history of michael, suspect bipolar disorder diagnosis was incorrect. More likely patient has depression, delirium, personality factors including irritability that put her at risk for increased agitation in the hospital and rehab setting.    Patient is extremely irritable, hostile, reports feeling neglected and confused. Demanding of staff, condescending. However, sleep and appetite are good and patient's speech is not pressured, largely linear. Suspect current presentation is largely due to personality pathology with immature defenses acutely exacerbated in the setting of severe medical illness, loss of autonomy, loss of her leg, prolonged hospital course, and other perceived injustices. Patient is engaging in displacement, splitting, somatization, and other primitive defenses. She is easily agitated. Low suspicion for michael at this time, however would benefit from increasing her depakote to target impulsivity and agitation. Patient appears to be motivated to return home, much is her frustration is related to her lack of autonomy in the hospital setting. As such, she is unlikely to benefit from inpatient psychiatric hospitalization and she denies any acute safety concerns.     Plan:  - INCREASE depakote 250mg at 8am, 250mg at 2pm, 500mg at 8pm for agitation, monitor platelets, LFTs, ammonia, VPA level  - recommend transfer to [facility] for behavioral management  - melatonin 3mg qhs  - seroquel 50mg q6h PRN for insomnia/anxiety  - ativan 2mg PO/IM for acute agitation  - routine obs  - delirium precautions  - will continue to attempt to contact daughter for collateral, but at this time there is low suspicion for primary mood disorder  - no role for inpatient psychiatry at this time, patient can return to [facility] when medically cleared

## 2024-03-20 NOTE — BH CONSULTATION LIAISON PROGRESS NOTE - NSBHMSEAFFRANGE_PSY_A_CORE
GLUCOSE 92 112* 84   CALCIUM 8.4* 8.5* 8.3*       Recent Labs     07/08/20  0205 07/09/20  0240 07/10/20  0415   WBC 4.9 6.9 7.2   RBC 4.40 4.40 4.11   HGB 13.4 13.1 12.2   HCT 40.2 40.3 38.1   MCV 91.4 91.6 92.7   MCH 30.5 29.8 29.7   MCHC 33.3 32.5 32.0   RDW 11.9 11.9 12.2    231 254   MPV 9.9 9.7 9.7           Assessment:    Active Problems:    COVID-19    Pneumonia due to COVID-19 virus    Dehydration    Hypokalemia    Rheumatoid arthritis (HCC)  Resolved Problems:    * No resolved hospital problems. *      Plan:  1. Acute COVID 19 pneumonia evident on the CT with positive test, no hypoxia, no indication for Decadron or Remdesivir. Will discuss with ID starting Plaquenil based on the new study coming from [facility]  2. Nausea and vomiting, most likely due to viral infection, continue on Compazine as per patient worked better than other antiemetic agents. Not improving, consult GI  3. History of asthma, not in exacerbation, continue home inhalers. NOTE: This report was transcribed using voice recognition software. Every effort was made to ensure accuracy; however, inadvertent computerized transcription errors may be present.   Electronically signed by Lesvia Leija MD on 7/10/2020 at 10:04 AM
Constricted
Labile

## 2024-03-20 NOTE — PROGRESS NOTE ADULT - PROBLEM SELECTOR PROBLEM 11
Need for prophylactic measure
Anemia
Need for prophylactic measure

## 2024-03-20 NOTE — PROGRESS NOTE ADULT - PROBLEM SELECTOR PROBLEM 5
Type 2 diabetes mellitus with diabetic neuropathy
Elevated troponin

## 2024-03-20 NOTE — PROGRESS NOTE ADULT - PROBLEM SELECTOR PLAN 10
- Chronic, stable based on prior labs on [facility]IE  - Monitor CBC
- c/w statin therapy

## 2024-03-20 NOTE — PROGRESS NOTE ADULT - SUBJECTIVE AND OBJECTIVE BOX
Medicine Progress Note    Patient is a 63y old  Female who presents with a chief complaint of Agitation (19 Mar 2024 19:47)      SUBJECTIVE / OVERNIGHT EVENTS: no events     ADDITIONAL REVIEW OF SYSTEMS: negative     MEDICATIONS  (STANDING):  albuterol/ipratropium for Nebulization 3 milliLiter(s) Nebulizer every 6 hours  aMIOdarone    Tablet 200 milliGRAM(s) Oral daily  apixaban 5 milliGRAM(s) Oral every 12 hours  budesonide 160 MICROgram(s)/formoterol 4.5 MICROgram(s) Inhaler 2 Puff(s) Inhalation two times a day  dextrose 5%. 1000 milliLiter(s) (50 mL/Hr) IV Continuous <Continuous>  dextrose 5%. 1000 milliLiter(s) (100 mL/Hr) IV Continuous <Continuous>  dextrose 50% Injectable 25 Gram(s) IV Push once  dextrose 50% Injectable 12.5 Gram(s) IV Push once  dextrose 50% Injectable 25 Gram(s) IV Push once  divalproex  milliGRAM(s) Oral two times a day  furosemide    Tablet 40 milliGRAM(s) Oral daily  glucagon  Injectable 1 milliGRAM(s) IntraMuscular once  hydrALAZINE 50 milliGRAM(s) Oral every 8 hours  influenza  Vaccine (HIGH DOSE) 0.7 milliLiter(s) IntraMuscular once  insulin glargine Injectable (LANTUS) 18 Unit(s) SubCutaneous at bedtime  insulin lispro (ADMELOG) corrective regimen sliding scale   SubCutaneous three times a day before meals  insulin lispro (ADMELOG) corrective regimen sliding scale   SubCutaneous at bedtime  insulin lispro Injectable (ADMELOG) 6 Unit(s) SubCutaneous three times a day before meals  labetalol 300 milliGRAM(s) Oral every 8 hours  lidocaine   4% Patch 1 Patch Transdermal daily  NIFEdipine XL 30 milliGRAM(s) Oral daily  pantoprazole    Tablet 40 milliGRAM(s) Oral before breakfast  simvastatin 20 milliGRAM(s) Oral at bedtime    MEDICATIONS  (PRN):  acetaminophen     Tablet .. 650 milliGRAM(s) Oral every 6 hours PRN Temp greater or equal to 38C (100.4F), Mild Pain (1 - 3)  aluminum hydroxide/magnesium hydroxide/simethicone Suspension 30 milliLiter(s) Oral every 4 hours PRN Dyspepsia  dextrose Oral Gel 15 Gram(s) Oral once PRN Blood Glucose LESS THAN 70 milliGRAM(s)/deciliter  melatonin 3 milliGRAM(s) Oral at bedtime PRN Insomnia  OLANZapine Injectable 1.25 milliGRAM(s) IntraMuscular every 6 hours PRN Severe agitation  ondansetron Injectable 4 milliGRAM(s) IV Push every 8 hours PRN Nausea and/or Vomiting  QUEtiapine 50 milliGRAM(s) Oral every 6 hours PRN anxiety or insomnia    CAPILLARY BLOOD GLUCOSE      POCT Blood Glucose.: 147 mg/dL (20 Mar 2024 17:32)  POCT Blood Glucose.: 327 mg/dL (19 Mar 2024 21:29)    I&O's Summary      PHYSICAL EXAM:  Vital Signs Last 24 Hrs  T(C): 37.1 (20 Mar 2024 12:19), Max: 37.1 (20 Mar 2024 12:19)  T(F): 98.7 (20 Mar 2024 12:19), Max: 98.7 (20 Mar 2024 12:19)  HR: 78 (20 Mar 2024 12:19) (72 - 79)  BP: 140/55 (20 Mar 2024 12:19) (139/53 - 149/58)  BP(mean): 82 (20 Mar 2024 04:52) (82 - 82)  RR: 18 (20 Mar 2024 12:19) (18 - 18)  SpO2: 95% (20 Mar 2024 12:19) (95% - 100%)    Parameters below as of 20 Mar 2024 12:19  Patient On (Oxygen Delivery Method): nasal cannula  O2 Flow (L/min): 3    CONSTITUTIONAL: NAD,   ENMT: Moist oral mucosa, no pharyngeal injection or exudates;   RESPIRATORY: Normal respiratory effort; lungs are clear to auscultation bilaterally  CARDIOVASCULAR: irregularly irregular  rhythm,  No lower extremity edema; RT BKA  ABDOMEN: Nontender to palpation, normoactive bowel sounds, no rebound/guarding;  PSYCH: A+O to person, place, and time; affect appropriate  NEUROLOGY: CN 2-12 are intact and symmetric; no gross sensory deficits   SKIN: No rashes; no palpable lesions    LABS:                        7.5    8.98  )-----------( 370      ( 20 Mar 2024 07:07 )             23.6     03-20    144  |  105  |  55<H>  ----------------------------<  213<H>  5.1   |  29  |  1.59<H>    Ca    8.4      20 Mar 2024 07:07  Phos  5.2     03-20  Mg     2.90     03-20            Urinalysis Basic - ( 20 Mar 2024 07:07 )    Color: x / Appearance: x / SG: x / pH: x  Gluc: 213 mg/dL / Ketone: x  / Bili: x / Urobili: x   Blood: x / Protein: x / Nitrite: x   Leuk Esterase: x / RBC: x / WBC x   Sq Epi: x / Non Sq Epi: x / Bacteria: x            RADIOLOGY & ADDITIONAL TESTS:  Imaging from Last 24 Hours:    Electrocardiogram/QTc Interval:    COORDINATION OF CARE:  Care Discussed with Consultants/Other Providers: RENATO  
Medicine Progress Note    Patient is a 63y old  Female who presents with a chief complaint of Agitation (17 Mar 2024 14:49)      SUBJECTIVE / OVERNIGHT EVENTS: no events over night , this morning c/o chest pain positional , EKG non ischemic     ADDITIONAL REVIEW OF SYSTEMS: negative     MEDICATIONS  (STANDING):  albuterol/ipratropium for Nebulization 3 milliLiter(s) Nebulizer every 6 hours  aMIOdarone    Tablet 200 milliGRAM(s) Oral daily  apixaban 5 milliGRAM(s) Oral every 12 hours  budesonide 160 MICROgram(s)/formoterol 4.5 MICROgram(s) Inhaler 2 Puff(s) Inhalation two times a day  dextrose 5%. 1000 milliLiter(s) (100 mL/Hr) IV Continuous <Continuous>  dextrose 5%. 1000 milliLiter(s) (50 mL/Hr) IV Continuous <Continuous>  dextrose 50% Injectable 25 Gram(s) IV Push once  dextrose 50% Injectable 12.5 Gram(s) IV Push once  dextrose 50% Injectable 25 Gram(s) IV Push once  divalproex  milliGRAM(s) Oral two times a day  furosemide   Injectable 40 milliGRAM(s) IV Push two times a day  glucagon  Injectable 1 milliGRAM(s) IntraMuscular once  hydrALAZINE 50 milliGRAM(s) Oral every 8 hours  influenza  Vaccine (HIGH DOSE) 0.7 milliLiter(s) IntraMuscular once  insulin glargine Injectable (LANTUS) 18 Unit(s) SubCutaneous at bedtime  insulin lispro (ADMELOG) corrective regimen sliding scale   SubCutaneous three times a day before meals  insulin lispro (ADMELOG) corrective regimen sliding scale   SubCutaneous at bedtime  insulin lispro Injectable (ADMELOG) 6 Unit(s) SubCutaneous three times a day before meals  labetalol 300 milliGRAM(s) Oral every 8 hours  lidocaine   4% Patch 1 Patch Transdermal daily  NIFEdipine XL 30 milliGRAM(s) Oral daily  pantoprazole    Tablet 40 milliGRAM(s) Oral before breakfast  piperacillin/tazobactam IVPB.. 3.375 Gram(s) IV Intermittent every 8 hours  simvastatin 20 milliGRAM(s) Oral at bedtime    MEDICATIONS  (PRN):  acetaminophen     Tablet .. 650 milliGRAM(s) Oral every 6 hours PRN Temp greater or equal to 38C (100.4F), Mild Pain (1 - 3)  aluminum hydroxide/magnesium hydroxide/simethicone Suspension 30 milliLiter(s) Oral every 4 hours PRN Dyspepsia  dextrose Oral Gel 15 Gram(s) Oral once PRN Blood Glucose LESS THAN 70 milliGRAM(s)/deciliter  melatonin 3 milliGRAM(s) Oral at bedtime PRN Insomnia  OLANZapine Injectable 1.25 milliGRAM(s) IntraMuscular every 6 hours PRN Severe agitation  ondansetron Injectable 4 milliGRAM(s) IV Push every 8 hours PRN Nausea and/or Vomiting  QUEtiapine 50 milliGRAM(s) Oral every 6 hours PRN anxiety or insomnia    CAPILLARY BLOOD GLUCOSE      POCT Blood Glucose.: 140 mg/dL (18 Mar 2024 17:28)  POCT Blood Glucose.: 238 mg/dL (18 Mar 2024 14:41)  POCT Blood Glucose.: 183 mg/dL (18 Mar 2024 13:36)  POCT Blood Glucose.: 151 mg/dL (18 Mar 2024 12:33)  POCT Blood Glucose.: 132 mg/dL (18 Mar 2024 08:36)  POCT Blood Glucose.: 177 mg/dL (17 Mar 2024 21:02)    I&O's Summary      PHYSICAL EXAM:  Vital Signs Last 24 Hrs  T(C): 36.3 (18 Mar 2024 12:17), Max: 36.6 (17 Mar 2024 20:01)  T(F): 97.3 (18 Mar 2024 12:17), Max: 97.9 (17 Mar 2024 20:01)  HR: 76 (18 Mar 2024 12:17) (70 - 76)  BP: 176/76 (18 Mar 2024 12:17) (121/50 - 176/76)  BP(mean): --  RR: 18 (18 Mar 2024 12:17) (18 - 18)  SpO2: 98% (18 Mar 2024 12:17) (98% - 100%)    Parameters below as of 18 Mar 2024 09:45  Patient On (Oxygen Delivery Method): nasal cannula  O2 Flow (L/min): 3    CONSTITUTIONAL: NAD  ENMT: Moist oral mucosa, no pharyngeal injection or exudates;   RESPIRATORY: Normal respiratory effort; lungs are clear to auscultation bilaterally  CARDIOVASCULAR: Regular rate and rhythm, normal S1 and S2,; No lower extremity edema;   ABDOMEN: Nontender to palpation, normoactive bowel sounds, no rebound/guarding;   PSYCH: A+O to person, place, and time; affect appropriate  NEUROLOGY: CN 2-12 are intact and symmetric; no gross sensory deficits   SKIN: No rashes; no palpable lesions    LABS:                    Culture - Urine (collected 16 Mar 2024 09:47)  Source: Clean Catch Clean Catch (Midstream)  Final Report (17 Mar 2024 19:09):    <10,000 CFU/mL Normal Urogenital Anastacia          RADIOLOGY & ADDITIONAL TESTS:  Imaging from Last 24 Hours:    Electrocardiogram/QTc Interval:    COORDINATION OF CARE:  Care Discussed with Consultants/Other Providers: ACP   
Normal rate, regular rhythm.  Heart sounds S1, S2.  No murmurs, rubs or gallops.
PROGRESS NOTE:     Patient is a 63y old  Female who presents with a chief complaint of Agitation (16 Mar 2024 15:25)      SUBJECTIVE / OVERNIGHT EVENTS: Seems calmer this AM. Denies shortness of breath, states she just feels tired.    ADDITIONAL REVIEW OF SYSTEMS:    MEDICATIONS  (STANDING):  albuterol/ipratropium for Nebulization 3 milliLiter(s) Nebulizer every 6 hours  aMIOdarone    Tablet 200 milliGRAM(s) Oral daily  apixaban 5 milliGRAM(s) Oral every 12 hours  budesonide 160 MICROgram(s)/formoterol 4.5 MICROgram(s) Inhaler 2 Puff(s) Inhalation two times a day  dextrose 5%. 1000 milliLiter(s) (100 mL/Hr) IV Continuous <Continuous>  dextrose 5%. 1000 milliLiter(s) (50 mL/Hr) IV Continuous <Continuous>  dextrose 50% Injectable 25 Gram(s) IV Push once  dextrose 50% Injectable 25 Gram(s) IV Push once  dextrose 50% Injectable 12.5 Gram(s) IV Push once  divalproex  milliGRAM(s) Oral two times a day  furosemide   Injectable 40 milliGRAM(s) IV Push two times a day  glucagon  Injectable 1 milliGRAM(s) IntraMuscular once  hydrALAZINE 50 milliGRAM(s) Oral every 8 hours  influenza  Vaccine (HIGH DOSE) 0.7 milliLiter(s) IntraMuscular once  insulin glargine Injectable (LANTUS) 18 Unit(s) SubCutaneous at bedtime  insulin lispro (ADMELOG) corrective regimen sliding scale   SubCutaneous three times a day before meals  insulin lispro (ADMELOG) corrective regimen sliding scale   SubCutaneous at bedtime  insulin lispro Injectable (ADMELOG) 6 Unit(s) SubCutaneous three times a day before meals  labetalol 300 milliGRAM(s) Oral every 8 hours  lidocaine   4% Patch 1 Patch Transdermal daily  NIFEdipine XL 30 milliGRAM(s) Oral daily  pantoprazole    Tablet 40 milliGRAM(s) Oral before breakfast  piperacillin/tazobactam IVPB.. 3.375 Gram(s) IV Intermittent every 8 hours  simvastatin 20 milliGRAM(s) Oral at bedtime    MEDICATIONS  (PRN):  acetaminophen     Tablet .. 650 milliGRAM(s) Oral every 6 hours PRN Temp greater or equal to 38C (100.4F), Mild Pain (1 - 3)  aluminum hydroxide/magnesium hydroxide/simethicone Suspension 30 milliLiter(s) Oral every 4 hours PRN Dyspepsia  dextrose Oral Gel 15 Gram(s) Oral once PRN Blood Glucose LESS THAN 70 milliGRAM(s)/deciliter  melatonin 3 milliGRAM(s) Oral at bedtime PRN Insomnia  OLANZapine Injectable 1.25 milliGRAM(s) IntraMuscular every 6 hours PRN Severe agitation  ondansetron Injectable 4 milliGRAM(s) IV Push every 8 hours PRN Nausea and/or Vomiting      CAPILLARY BLOOD GLUCOSE      POCT Blood Glucose.: 107 mg/dL (17 Mar 2024 12:14)  POCT Blood Glucose.: 153 mg/dL (17 Mar 2024 08:19)  POCT Blood Glucose.: 98 mg/dL (16 Mar 2024 22:20)  POCT Blood Glucose.: 155 mg/dL (16 Mar 2024 17:40)    I&O's Summary      PHYSICAL EXAM:  Vital Signs Last 24 Hrs  T(C): 36.4 (17 Mar 2024 13:08), Max: 36.8 (16 Mar 2024 19:03)  T(F): 97.6 (17 Mar 2024 13:08), Max: 98.2 (16 Mar 2024 19:03)  HR: 67 (17 Mar 2024 13:08) (67 - 71)  BP: 165/74 (17 Mar 2024 13:08) (131/55 - 165/74)  BP(mean): --  RR: 18 (17 Mar 2024 13:08) (18 - 18)  SpO2: 98% (17 Mar 2024 13:08) (95% - 100%)    Parameters below as of 17 Mar 2024 13:08  Patient On (Oxygen Delivery Method): nasal cannula  O2 Flow (L/min): 3      CONSTITUTIONAL: NAD  RESPIRATORY: Normal respiratory effort; decreased breath sounds   CARDIOVASCULAR: Regular rate and rhythm, normal S1 and S2, no murmur/rub/gallop; No lower extremity edema; Peripheral pulses are 2+ bilaterally  ABDOMEN: Nontender to palpation, normoactive bowel sounds, no rebound/guarding; No hepatosplenomegaly  MUSCLOSKELETAL: no clubbing or cyanosis of digits; no joint swelling or tenderness to palpation  PSYCH: A+O to person, place; calm  NEURO: Lethargic but easily arousable, CN's grossly intact     LABS:                        8.4    10.48 )-----------( 401      ( 16 Mar 2024 09:28 )             26.1     03-16    137  |  102  |  53<H>  ----------------------------<  171<H>  5.3   |  26  |  0.96    Ca    9.3      16 Mar 2024 09:28  Phos  4.5     03-16  Mg     3.00     03-16    TPro  6.4  /  Alb  3.4  /  TBili  <0.2  /  DBili  x   /  AST  23  /  ALT  34<H>  /  AlkPhos  112  03-16      CARDIAC MARKERS ( 16 Mar 2024 03:22 )  x     / x     / 85 U/L / x     / x          Urinalysis Basic - ( 16 Mar 2024 09:47 )    Color: Yellow / Appearance: Clear / S.028 / pH: x  Gluc: x / Ketone: Negative mg/dL  / Bili: Negative / Urobili: 0.2 mg/dL   Blood: x / Protein: >=1000 mg/dL / Nitrite: Negative   Leuk Esterase: Negative / RBC: 10 - 15 /HPF / WBC 0 - 5 /HPF   Sq Epi: x / Non Sq Epi: x / Bacteria: Negative /HPF          RADIOLOGY & ADDITIONAL TESTS:  Results Reviewed:   Imaging Personally Reviewed:  CT chest:   Moderate right and small left pleural effusions. Mild left pleural   thickening.    Interstitial pulmonary edema.    Peripheral groundglass in the right upper lobe which may be infectious in   the appropriate clinical setting.    Electrocardiogram Personally Reviewed:    COORDINATION OF CARE:  Care Discussed with Consultants/Other Providers [Y/N]:  Prior or Outpatient Records Reviewed [Y/N]:  
PROGRESS NOTE:     Patient is a 63y old  Female who presents with a chief complaint of Agitation (16 Mar 2024 08:23)      SUBJECTIVE / OVERNIGHT EVENTS: Pt crying, says she is wheezing and wants to be left alone.     ADDITIONAL REVIEW OF SYSTEMS:    MEDICATIONS  (STANDING):  albuterol/ipratropium for Nebulization 3 milliLiter(s) Nebulizer every 6 hours  aMIOdarone    Tablet 200 milliGRAM(s) Oral daily  apixaban 5 milliGRAM(s) Oral every 12 hours  budesonide 160 MICROgram(s)/formoterol 4.5 MICROgram(s) Inhaler 2 Puff(s) Inhalation two times a day  dextrose 5%. 1000 milliLiter(s) (50 mL/Hr) IV Continuous <Continuous>  dextrose 5%. 1000 milliLiter(s) (100 mL/Hr) IV Continuous <Continuous>  dextrose 50% Injectable 25 Gram(s) IV Push once  dextrose 50% Injectable 12.5 Gram(s) IV Push once  dextrose 50% Injectable 25 Gram(s) IV Push once  divalproex  milliGRAM(s) Oral <User Schedule>  glucagon  Injectable 1 milliGRAM(s) IntraMuscular once  hydrALAZINE 50 milliGRAM(s) Oral every 8 hours  influenza  Vaccine (HIGH DOSE) 0.7 milliLiter(s) IntraMuscular once  insulin glargine Injectable (LANTUS) 18 Unit(s) SubCutaneous at bedtime  insulin lispro (ADMELOG) corrective regimen sliding scale   SubCutaneous three times a day before meals  insulin lispro (ADMELOG) corrective regimen sliding scale   SubCutaneous at bedtime  insulin lispro Injectable (ADMELOG) 6 Unit(s) SubCutaneous three times a day before meals  labetalol 300 milliGRAM(s) Oral every 8 hours  lidocaine   4% Patch 1 Patch Transdermal daily  NIFEdipine XL 30 milliGRAM(s) Oral daily  pantoprazole    Tablet 40 milliGRAM(s) Oral before breakfast  piperacillin/tazobactam IVPB.. 3.375 Gram(s) IV Intermittent every 8 hours  simvastatin 20 milliGRAM(s) Oral at bedtime    MEDICATIONS  (PRN):  acetaminophen     Tablet .. 650 milliGRAM(s) Oral every 6 hours PRN Temp greater or equal to 38C (100.4F), Mild Pain (1 - 3)  aluminum hydroxide/magnesium hydroxide/simethicone Suspension 30 milliLiter(s) Oral every 4 hours PRN Dyspepsia  dextrose Oral Gel 15 Gram(s) Oral once PRN Blood Glucose LESS THAN 70 milliGRAM(s)/deciliter  melatonin 3 milliGRAM(s) Oral at bedtime PRN Insomnia  OLANZapine Injectable 1.25 milliGRAM(s) IntraMuscular every 6 hours PRN Severe agitation  ondansetron Injectable 4 milliGRAM(s) IV Push every 8 hours PRN Nausea and/or Vomiting      CAPILLARY BLOOD GLUCOSE      POCT Blood Glucose.: 208 mg/dL (16 Mar 2024 12:21)  POCT Blood Glucose.: 154 mg/dL (16 Mar 2024 08:35)  POCT Blood Glucose.: 214 mg/dL (16 Mar 2024 03:46)  POCT Blood Glucose.: 316 mg/dL (15 Mar 2024 23:02)  POCT Blood Glucose.: 266 mg/dL (15 Mar 2024 16:17)    I&O's Summary      PHYSICAL EXAM:  Vital Signs Last 24 Hrs  T(C): 36.7 (16 Mar 2024 14:16), Max: 36.9 (16 Mar 2024 08:50)  T(F): 98.1 (16 Mar 2024 14:16), Max: 98.5 (16 Mar 2024 08:50)  HR: 82 (16 Mar 2024 14:16) (77 - 89)  BP: 180/82 (16 Mar 2024 14:16) (130/50 - 213/82)  BP(mean): --  RR: 18 (16 Mar 2024 14:16) (18 - 19)  SpO2: 97% (16 Mar 2024 14:16) (88% - 98%)    Parameters below as of 16 Mar 2024 14:16  Patient On (Oxygen Delivery Method): nasal cannula  O2 Flow (L/min): 3      Patient refused physical exam     LABS:                        8.4    10.48 )-----------( 401      ( 16 Mar 2024 09:28 )             26.1     03-16    137  |  102  |  53<H>  ----------------------------<  171<H>  5.3   |  26  |  0.96    Ca    9.3      16 Mar 2024 09:28  Phos  4.5     03-16  Mg     3.00     03-16    TPro  6.4  /  Alb  3.4  /  TBili  <0.2  /  DBili  x   /  AST  23  /  ALT  34<H>  /  AlkPhos  112  03-16      CARDIAC MARKERS ( 16 Mar 2024 03:22 )  x     / x     / 85 U/L / x     / x          Urinalysis Basic - ( 16 Mar 2024 09:47 )    Color: Yellow / Appearance: Clear / S.028 / pH: x  Gluc: x / Ketone: Negative mg/dL  / Bili: Negative / Urobili: 0.2 mg/dL   Blood: x / Protein: >=1000 mg/dL / Nitrite: Negative   Leuk Esterase: Negative / RBC: 10 - 15 /HPF / WBC 0 - 5 /HPF   Sq Epi: x / Non Sq Epi: x / Bacteria: Negative /HPF          RADIOLOGY & ADDITIONAL TESTS:  Results Reviewed:   Imaging Personally Reviewed:  Electrocardiogram Personally Reviewed:    COORDINATION OF CARE:  Care Discussed with Consultants/Other Providers [Y/N]:  Prior or Outpatient Records Reviewed [Y/N]:  
Medicine Progress Note    Patient is a 63y old  Female who presents with a chief complaint of Agitation (18 Mar 2024 19:19)      SUBJECTIVE / OVERNIGHT EVENTS:  no events over night    ADDITIONAL REVIEW OF SYSTEMS: negative     MEDICATIONS  (STANDING):  albuterol/ipratropium for Nebulization 3 milliLiter(s) Nebulizer every 6 hours  aMIOdarone    Tablet 200 milliGRAM(s) Oral daily  apixaban 5 milliGRAM(s) Oral every 12 hours  budesonide 160 MICROgram(s)/formoterol 4.5 MICROgram(s) Inhaler 2 Puff(s) Inhalation two times a day  dextrose 5%. 1000 milliLiter(s) (50 mL/Hr) IV Continuous <Continuous>  dextrose 5%. 1000 milliLiter(s) (100 mL/Hr) IV Continuous <Continuous>  dextrose 50% Injectable 25 Gram(s) IV Push once  dextrose 50% Injectable 12.5 Gram(s) IV Push once  dextrose 50% Injectable 25 Gram(s) IV Push once  divalproex  milliGRAM(s) Oral two times a day  furosemide   Injectable 40 milliGRAM(s) IV Push two times a day  glucagon  Injectable 1 milliGRAM(s) IntraMuscular once  hydrALAZINE 50 milliGRAM(s) Oral every 8 hours  influenza  Vaccine (HIGH DOSE) 0.7 milliLiter(s) IntraMuscular once  insulin glargine Injectable (LANTUS) 18 Unit(s) SubCutaneous at bedtime  insulin lispro (ADMELOG) corrective regimen sliding scale   SubCutaneous three times a day before meals  insulin lispro (ADMELOG) corrective regimen sliding scale   SubCutaneous at bedtime  insulin lispro Injectable (ADMELOG) 6 Unit(s) SubCutaneous three times a day before meals  labetalol 300 milliGRAM(s) Oral every 8 hours  lidocaine   4% Patch 1 Patch Transdermal daily  NIFEdipine XL 30 milliGRAM(s) Oral daily  pantoprazole    Tablet 40 milliGRAM(s) Oral before breakfast  piperacillin/tazobactam IVPB.. 3.375 Gram(s) IV Intermittent every 8 hours  simvastatin 20 milliGRAM(s) Oral at bedtime    MEDICATIONS  (PRN):  acetaminophen     Tablet .. 650 milliGRAM(s) Oral every 6 hours PRN Temp greater or equal to 38C (100.4F), Mild Pain (1 - 3)  aluminum hydroxide/magnesium hydroxide/simethicone Suspension 30 milliLiter(s) Oral every 4 hours PRN Dyspepsia  dextrose Oral Gel 15 Gram(s) Oral once PRN Blood Glucose LESS THAN 70 milliGRAM(s)/deciliter  melatonin 3 milliGRAM(s) Oral at bedtime PRN Insomnia  OLANZapine Injectable 1.25 milliGRAM(s) IntraMuscular every 6 hours PRN Severe agitation  ondansetron Injectable 4 milliGRAM(s) IV Push every 8 hours PRN Nausea and/or Vomiting  QUEtiapine 50 milliGRAM(s) Oral every 6 hours PRN anxiety or insomnia    CAPILLARY BLOOD GLUCOSE      POCT Blood Glucose.: 262 mg/dL (19 Mar 2024 17:28)  POCT Blood Glucose.: 190 mg/dL (18 Mar 2024 21:36)    I&O's Summary      PHYSICAL EXAM:  Vital Signs Last 24 Hrs  T(C): 36.7 (19 Mar 2024 12:27), Max: 36.8 (18 Mar 2024 19:53)  T(F): 98 (19 Mar 2024 12:27), Max: 98.3 (18 Mar 2024 19:53)  HR: 75 (19 Mar 2024 12:27) (75 - 96)  BP: 147/83 (19 Mar 2024 12:27) (132/64 - 164/75)  BP(mean): --  RR: 18 (19 Mar 2024 12:27) (18 - 18)  SpO2: 98% (19 Mar 2024 12:27) (97% - 98%)    Parameters below as of 19 Mar 2024 12:27  Patient On (Oxygen Delivery Method): nasal cannula  O2 Flow (L/min): 3    CONSTITUTIONAL: NAD,   ENMT: Moist oral mucosa, no pharyngeal injection or exudates; normal dentition  RESPIRATORY: Normal respiratory effort; lungs are clear to auscultation bilaterally  CARDIOVASCULAR: Regular rate and rhythm, normal S1 and S2, ; No lower extremity edema;RT BKA  ABDOMEN: Nontender to palpation, normoactive bowel sounds, no rebound/guarding;   PSYCH: A+O to person, place, and time; affect appropriate  NEUROLOGY: CN 2-12 are intact and symmetric; no gross sensory deficits   SKIN: No rashes; no palpable lesions    LABS:                        RADIOLOGY & ADDITIONAL TESTS:  Imaging from Last 24 Hours:    Electrocardiogram/QTc Interval:    COORDINATION OF CARE:  Care Discussed with Consultants/Other Providers: RENATO

## 2024-03-20 NOTE — PROGRESS NOTE ADULT - PROBLEM SELECTOR PLAN 3
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
- Seems to have CKD2-3 on review of labs on [facility]  - Here with new hyperkalemia s/p medical management, EKG without concerning changes 2/2 hyperkalemia  - monitor BMP
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

## 2024-03-20 NOTE — PROGRESS NOTE ADULT - PROBLEM SELECTOR PLAN 5
- likely d/t demand ischemia   - trop 116-->112-->104  - check TTE
- On lantus 22U qHS and admelog 8U TID qAC  - Hgb A1c 6.1   - Will place on lantus 18U 1qHS, admelog 6U TID qAC  - low dose ISS  - monitor fingersticks   - CC diet
- likely d/t demand ischemia   - trop 116-->112-->104  - check TTE

## 2024-03-20 NOTE — PROGRESS NOTE ADULT - PROBLEM SELECTOR PLAN 7
- chronic Afib   - c/w eliquis  - c/w amidarone
- Check TTE   - proBNP [id number]  - holding off on diuresis for now   - monitor volume status
- chronic Afib   - c/w eliquis  - c/w amidarone

## 2024-03-21 VITALS
DIASTOLIC BLOOD PRESSURE: 60 MMHG | RESPIRATION RATE: 18 BRPM | TEMPERATURE: 98 F | OXYGEN SATURATION: 99 % | HEART RATE: 73 BPM | SYSTOLIC BLOOD PRESSURE: 124 MMHG

## 2024-03-21 LAB
ANION GAP SERPL CALC-SCNC: 7 MMOL/L — SIGNIFICANT CHANGE UP (ref 7–14)
BASOPHILS # BLD AUTO: 0.05 K/UL — SIGNIFICANT CHANGE UP (ref 0–0.2)
BASOPHILS NFR BLD AUTO: 0.6 % — SIGNIFICANT CHANGE UP (ref 0–2)
BUN SERPL-MCNC: 50 MG/DL — HIGH (ref 7–23)
CALCIUM SERPL-MCNC: 8.6 MG/DL — SIGNIFICANT CHANGE UP (ref 8.4–10.5)
CHLORIDE SERPL-SCNC: 106 MMOL/L — SIGNIFICANT CHANGE UP (ref 98–107)
CO2 SERPL-SCNC: 28 MMOL/L — SIGNIFICANT CHANGE UP (ref 22–31)
CREAT SERPL-MCNC: 1.19 MG/DL — SIGNIFICANT CHANGE UP (ref 0.5–1.3)
EGFR: 51 ML/MIN/1.73M2 — LOW
EOSINOPHIL # BLD AUTO: 0.32 K/UL — SIGNIFICANT CHANGE UP (ref 0–0.5)
EOSINOPHIL NFR BLD AUTO: 4.1 % — SIGNIFICANT CHANGE UP (ref 0–6)
GLUCOSE BLDC GLUCOMTR-MCNC: 118 MG/DL — HIGH (ref 70–99)
GLUCOSE BLDC GLUCOMTR-MCNC: 70 MG/DL — SIGNIFICANT CHANGE UP (ref 70–99)
GLUCOSE SERPL-MCNC: 61 MG/DL — LOW (ref 70–99)
HCT VFR BLD CALC: 26.5 % — LOW (ref 34.5–45)
HGB BLD-MCNC: 8.4 G/DL — LOW (ref 11.5–15.5)
IANC: 5.59 K/UL — SIGNIFICANT CHANGE UP (ref 1.8–7.4)
IMM GRANULOCYTES NFR BLD AUTO: 0.1 % — SIGNIFICANT CHANGE UP (ref 0–0.9)
LYMPHOCYTES # BLD AUTO: 1.14 K/UL — SIGNIFICANT CHANGE UP (ref 1–3.3)
LYMPHOCYTES # BLD AUTO: 14.6 % — SIGNIFICANT CHANGE UP (ref 13–44)
MAGNESIUM SERPL-MCNC: 2.7 MG/DL — HIGH (ref 1.6–2.6)
MCHC RBC-ENTMCNC: 29.3 PG — SIGNIFICANT CHANGE UP (ref 27–34)
MCHC RBC-ENTMCNC: 31.7 GM/DL — LOW (ref 32–36)
MCV RBC AUTO: 92.3 FL — SIGNIFICANT CHANGE UP (ref 80–100)
MONOCYTES # BLD AUTO: 0.72 K/UL — SIGNIFICANT CHANGE UP (ref 0–0.9)
MONOCYTES NFR BLD AUTO: 9.2 % — SIGNIFICANT CHANGE UP (ref 2–14)
NEUTROPHILS # BLD AUTO: 5.59 K/UL — SIGNIFICANT CHANGE UP (ref 1.8–7.4)
NEUTROPHILS NFR BLD AUTO: 71.4 % — SIGNIFICANT CHANGE UP (ref 43–77)
NRBC # BLD: 0 /100 WBCS — SIGNIFICANT CHANGE UP (ref 0–0)
NRBC # FLD: 0 K/UL — SIGNIFICANT CHANGE UP (ref 0–0)
PHOSPHATE SERPL-MCNC: 4.2 MG/DL — SIGNIFICANT CHANGE UP (ref 2.5–4.5)
PLATELET # BLD AUTO: 368 K/UL — SIGNIFICANT CHANGE UP (ref 150–400)
POTASSIUM SERPL-MCNC: 5 MMOL/L — SIGNIFICANT CHANGE UP (ref 3.5–5.3)
POTASSIUM SERPL-SCNC: 5 MMOL/L — SIGNIFICANT CHANGE UP (ref 3.5–5.3)
RBC # BLD: 2.87 M/UL — LOW (ref 3.8–5.2)
RBC # FLD: 16.1 % — HIGH (ref 10.3–14.5)
SODIUM SERPL-SCNC: 141 MMOL/L — SIGNIFICANT CHANGE UP (ref 135–145)
WBC # BLD: 7.83 K/UL — SIGNIFICANT CHANGE UP (ref 3.8–10.5)
WBC # FLD AUTO: 7.83 K/UL — SIGNIFICANT CHANGE UP (ref 3.8–10.5)

## 2024-03-21 PROCEDURE — 99239 HOSP IP/OBS DSCHRG MGMT >30: CPT

## 2024-03-21 PROCEDURE — 99232 SBSQ HOSP IP/OBS MODERATE 35: CPT

## 2024-03-21 RX ORDER — FUROSEMIDE 40 MG
1 TABLET ORAL
Qty: 0 | Refills: 0 | DISCHARGE
Start: 2024-03-21

## 2024-03-21 RX ORDER — INSULIN LISPRO 100/ML
6 VIAL (ML) SUBCUTANEOUS
Qty: 0 | Refills: 0 | DISCHARGE

## 2024-03-21 RX ORDER — DIVALPROEX SODIUM 500 MG/1
250 TABLET, DELAYED RELEASE ORAL
Refills: 0 | Status: DISCONTINUED | OUTPATIENT
Start: 2024-03-21 | End: 2024-03-21

## 2024-03-21 RX ORDER — DIVALPROEX SODIUM 500 MG/1
1 TABLET, DELAYED RELEASE ORAL
Refills: 0 | DISCHARGE

## 2024-03-21 RX ORDER — HYDRALAZINE HCL 50 MG
2 TABLET ORAL
Refills: 0 | DISCHARGE

## 2024-03-21 RX ORDER — DIVALPROEX SODIUM 500 MG/1
1 TABLET, DELAYED RELEASE ORAL
Qty: 0 | Refills: 0 | DISCHARGE
Start: 2024-03-21

## 2024-03-21 RX ORDER — HYDRALAZINE HCL 50 MG
1 TABLET ORAL
Qty: 0 | Refills: 0 | DISCHARGE
Start: 2024-03-21

## 2024-03-21 RX ORDER — INSULIN GLARGINE 100 [IU]/ML
22 INJECTION, SOLUTION SUBCUTANEOUS
Refills: 0 | DISCHARGE

## 2024-03-21 RX ORDER — INSULIN GLARGINE 100 [IU]/ML
18 INJECTION, SOLUTION SUBCUTANEOUS
Qty: 0 | Refills: 0 | DISCHARGE
Start: 2024-03-21

## 2024-03-21 RX ADMIN — Medication 6 UNIT(S): at 08:14

## 2024-03-21 RX ADMIN — APIXABAN 5 MILLIGRAM(S): 2.5 TABLET, FILM COATED ORAL at 05:48

## 2024-03-21 RX ADMIN — DIVALPROEX SODIUM 250 MILLIGRAM(S): 500 TABLET, DELAYED RELEASE ORAL at 13:39

## 2024-03-21 RX ADMIN — DIVALPROEX SODIUM 250 MILLIGRAM(S): 500 TABLET, DELAYED RELEASE ORAL at 05:48

## 2024-03-21 RX ADMIN — Medication 3 MILLILITER(S): at 03:55

## 2024-03-21 RX ADMIN — BUDESONIDE AND FORMOTEROL FUMARATE DIHYDRATE 2 PUFF(S): 160; 4.5 AEROSOL RESPIRATORY (INHALATION) at 10:25

## 2024-03-21 RX ADMIN — Medication 40 MILLIGRAM(S): at 05:48

## 2024-03-21 RX ADMIN — Medication 300 MILLIGRAM(S): at 05:48

## 2024-03-21 RX ADMIN — Medication 50 MILLIGRAM(S): at 01:29

## 2024-03-21 RX ADMIN — Medication 50 MILLIGRAM(S): at 13:41

## 2024-03-21 RX ADMIN — LIDOCAINE 1 PATCH: 4 CREAM TOPICAL at 13:40

## 2024-03-21 RX ADMIN — PANTOPRAZOLE SODIUM 40 MILLIGRAM(S): 20 TABLET, DELAYED RELEASE ORAL at 05:52

## 2024-03-21 RX ADMIN — Medication 30 MILLIGRAM(S): at 05:49

## 2024-03-21 RX ADMIN — QUETIAPINE FUMARATE 50 MILLIGRAM(S): 200 TABLET, FILM COATED ORAL at 09:20

## 2024-03-21 RX ADMIN — Medication 300 MILLIGRAM(S): at 13:41

## 2024-03-21 RX ADMIN — Medication 6 UNIT(S): at 13:11

## 2024-03-21 RX ADMIN — AMIODARONE HYDROCHLORIDE 200 MILLIGRAM(S): 400 TABLET ORAL at 05:48

## 2024-03-21 NOTE — BH CONSULTATION LIAISON PROGRESS NOTE - CURRENT MEDICATION
MEDICATIONS  (STANDING):  albuterol/ipratropium for Nebulization 3 milliLiter(s) Nebulizer every 6 hours  aMIOdarone    Tablet 200 milliGRAM(s) Oral daily  apixaban 5 milliGRAM(s) Oral every 12 hours  budesonide 160 MICROgram(s)/formoterol 4.5 MICROgram(s) Inhaler 2 Puff(s) Inhalation two times a day  dextrose 5%. 1000 milliLiter(s) (50 mL/Hr) IV Continuous <Continuous>  dextrose 5%. 1000 milliLiter(s) (100 mL/Hr) IV Continuous <Continuous>  dextrose 50% Injectable 25 Gram(s) IV Push once  dextrose 50% Injectable 12.5 Gram(s) IV Push once  dextrose 50% Injectable 25 Gram(s) IV Push once  divalproex  milliGRAM(s) Oral <User Schedule>  furosemide    Tablet 40 milliGRAM(s) Oral daily  glucagon  Injectable 1 milliGRAM(s) IntraMuscular once  hydrALAZINE 50 milliGRAM(s) Oral every 8 hours  influenza  Vaccine (HIGH DOSE) 0.7 milliLiter(s) IntraMuscular once  insulin glargine Injectable (LANTUS) 18 Unit(s) SubCutaneous at bedtime  insulin lispro (ADMELOG) corrective regimen sliding scale   SubCutaneous three times a day before meals  insulin lispro (ADMELOG) corrective regimen sliding scale   SubCutaneous at bedtime  insulin lispro Injectable (ADMELOG) 6 Unit(s) SubCutaneous three times a day before meals  labetalol 300 milliGRAM(s) Oral every 8 hours  lidocaine   4% Patch 1 Patch Transdermal daily  NIFEdipine XL 30 milliGRAM(s) Oral daily  pantoprazole    Tablet 40 milliGRAM(s) Oral before breakfast  simvastatin 20 milliGRAM(s) Oral at bedtime    MEDICATIONS  (PRN):  acetaminophen     Tablet .. 650 milliGRAM(s) Oral every 6 hours PRN Temp greater or equal to 38C (100.4F), Mild Pain (1 - 3)  aluminum hydroxide/magnesium hydroxide/simethicone Suspension 30 milliLiter(s) Oral every 4 hours PRN Dyspepsia  dextrose Oral Gel 15 Gram(s) Oral once PRN Blood Glucose LESS THAN 70 milliGRAM(s)/deciliter  melatonin 3 milliGRAM(s) Oral at bedtime PRN Insomnia  OLANZapine Injectable 1.25 milliGRAM(s) IntraMuscular every 6 hours PRN Severe agitation  ondansetron Injectable 4 milliGRAM(s) IV Push every 8 hours PRN Nausea and/or Vomiting  QUEtiapine 50 milliGRAM(s) Oral every 6 hours PRN anxiety or insomnia  
MEDICATIONS  (STANDING):  albuterol/ipratropium for Nebulization 3 milliLiter(s) Nebulizer every 6 hours  aMIOdarone    Tablet 200 milliGRAM(s) Oral daily  apixaban 5 milliGRAM(s) Oral every 12 hours  budesonide 160 MICROgram(s)/formoterol 4.5 MICROgram(s) Inhaler 2 Puff(s) Inhalation two times a day  dextrose 5%. 1000 milliLiter(s) (50 mL/Hr) IV Continuous <Continuous>  dextrose 5%. 1000 milliLiter(s) (100 mL/Hr) IV Continuous <Continuous>  dextrose 50% Injectable 25 Gram(s) IV Push once  dextrose 50% Injectable 12.5 Gram(s) IV Push once  dextrose 50% Injectable 25 Gram(s) IV Push once  divalproex  milliGRAM(s) Oral two times a day  furosemide   Injectable 40 milliGRAM(s) IV Push two times a day  glucagon  Injectable 1 milliGRAM(s) IntraMuscular once  hydrALAZINE 50 milliGRAM(s) Oral every 8 hours  influenza  Vaccine (HIGH DOSE) 0.7 milliLiter(s) IntraMuscular once  insulin glargine Injectable (LANTUS) 18 Unit(s) SubCutaneous at bedtime  insulin lispro (ADMELOG) corrective regimen sliding scale   SubCutaneous at bedtime  insulin lispro (ADMELOG) corrective regimen sliding scale   SubCutaneous three times a day before meals  insulin lispro Injectable (ADMELOG) 6 Unit(s) SubCutaneous three times a day before meals  labetalol 300 milliGRAM(s) Oral every 8 hours  lidocaine   4% Patch 1 Patch Transdermal daily  NIFEdipine XL 30 milliGRAM(s) Oral daily  pantoprazole    Tablet 40 milliGRAM(s) Oral before breakfast  piperacillin/tazobactam IVPB.. 3.375 Gram(s) IV Intermittent every 8 hours  simvastatin 20 milliGRAM(s) Oral at bedtime    MEDICATIONS  (PRN):  acetaminophen     Tablet .. 650 milliGRAM(s) Oral every 6 hours PRN Temp greater or equal to 38C (100.4F), Mild Pain (1 - 3)  aluminum hydroxide/magnesium hydroxide/simethicone Suspension 30 milliLiter(s) Oral every 4 hours PRN Dyspepsia  dextrose Oral Gel 15 Gram(s) Oral once PRN Blood Glucose LESS THAN 70 milliGRAM(s)/deciliter  melatonin 3 milliGRAM(s) Oral at bedtime PRN Insomnia  OLANZapine Injectable 1.25 milliGRAM(s) IntraMuscular every 6 hours PRN Severe agitation  ondansetron Injectable 4 milliGRAM(s) IV Push every 8 hours PRN Nausea and/or Vomiting  
MEDICATIONS  (STANDING):  albuterol/ipratropium for Nebulization 3 milliLiter(s) Nebulizer every 6 hours  aMIOdarone    Tablet 200 milliGRAM(s) Oral daily  apixaban 5 milliGRAM(s) Oral every 12 hours  budesonide 160 MICROgram(s)/formoterol 4.5 MICROgram(s) Inhaler 2 Puff(s) Inhalation two times a day  dextrose 5%. 1000 milliLiter(s) (50 mL/Hr) IV Continuous <Continuous>  dextrose 5%. 1000 milliLiter(s) (100 mL/Hr) IV Continuous <Continuous>  dextrose 50% Injectable 25 Gram(s) IV Push once  dextrose 50% Injectable 12.5 Gram(s) IV Push once  dextrose 50% Injectable 25 Gram(s) IV Push once  divalproex  milliGRAM(s) Oral two times a day  furosemide    Tablet 40 milliGRAM(s) Oral daily  glucagon  Injectable 1 milliGRAM(s) IntraMuscular once  hydrALAZINE 50 milliGRAM(s) Oral every 8 hours  influenza  Vaccine (HIGH DOSE) 0.7 milliLiter(s) IntraMuscular once  insulin glargine Injectable (LANTUS) 18 Unit(s) SubCutaneous at bedtime  insulin lispro (ADMELOG) corrective regimen sliding scale   SubCutaneous three times a day before meals  insulin lispro (ADMELOG) corrective regimen sliding scale   SubCutaneous at bedtime  insulin lispro Injectable (ADMELOG) 6 Unit(s) SubCutaneous three times a day before meals  labetalol 300 milliGRAM(s) Oral every 8 hours  lidocaine   4% Patch 1 Patch Transdermal daily  NIFEdipine XL 30 milliGRAM(s) Oral daily  pantoprazole    Tablet 40 milliGRAM(s) Oral before breakfast  simvastatin 20 milliGRAM(s) Oral at bedtime    MEDICATIONS  (PRN):  acetaminophen     Tablet .. 650 milliGRAM(s) Oral every 6 hours PRN Temp greater or equal to 38C (100.4F), Mild Pain (1 - 3)  aluminum hydroxide/magnesium hydroxide/simethicone Suspension 30 milliLiter(s) Oral every 4 hours PRN Dyspepsia  dextrose Oral Gel 15 Gram(s) Oral once PRN Blood Glucose LESS THAN 70 milliGRAM(s)/deciliter  melatonin 3 milliGRAM(s) Oral at bedtime PRN Insomnia  OLANZapine Injectable 1.25 milliGRAM(s) IntraMuscular every 6 hours PRN Severe agitation  ondansetron Injectable 4 milliGRAM(s) IV Push every 8 hours PRN Nausea and/or Vomiting  QUEtiapine 50 milliGRAM(s) Oral every 6 hours PRN anxiety or insomnia

## 2024-03-21 NOTE — BH CONSULTATION LIAISON PROGRESS NOTE - MSE UNSTRUCTURED FT
Mental Status Exam:  Deanne: well groomed, fair hygiene, BKA     Behavior: calm, cooperative, no psychomotor retardation/agitation  Motor: no tremors, EPS, or rigidity  Gait: did not assess, pt in bed  Speech: normal rate, rhythm, prosody and volume   Mood: "okay"  Affect: euthymic, congruent  Thought process: clear, goal directed   Thought Content: denies paranoia, delusions; talks about going back to rehab and wanting to talk to daughter  Perception: denies AH/VH  SI: denies  HI: denies  Insight: fair  Judgment: fair    Cognitive Exam:  Attention: intact

## 2024-03-21 NOTE — BH CONSULTATION LIAISON PROGRESS NOTE - NSICDXBHPRIMARYDX_PSY_ALL_CORE
Delirium due to another medical condition   F05  
Delirium due to another medical condition   F05  
Mood disorder   F39

## 2024-03-21 NOTE — BH CONSULTATION LIAISON PROGRESS NOTE - NSBHCHARTREVIEWVS_PSY_A_CORE FT
Vital Signs Last 24 Hrs  T(C): 36.8 (21 Mar 2024 04:36), Max: 36.8 (21 Mar 2024 04:36)  T(F): 98.3 (21 Mar 2024 04:36), Max: 98.3 (21 Mar 2024 04:36)  HR: 73 (21 Mar 2024 04:36) (73 - 78)  BP: 170/64 (21 Mar 2024 04:36) (170/64 - 197/98)  BP(mean): --  RR: 17 (21 Mar 2024 04:36) (17 - 18)  SpO2: 98% (21 Mar 2024 04:36) (95% - 98%)    Parameters below as of 21 Mar 2024 04:36  Patient On (Oxygen Delivery Method): room air    
Vital Signs Last 24 Hrs  T(C): 36.3 (18 Mar 2024 12:17), Max: 36.6 (17 Mar 2024 20:01)  T(F): 97.3 (18 Mar 2024 12:17), Max: 97.9 (17 Mar 2024 20:01)  HR: 76 (18 Mar 2024 12:17) (70 - 76)  BP: 176/76 (18 Mar 2024 12:17) (121/50 - 176/76)  BP(mean): --  RR: 18 (18 Mar 2024 12:17) (18 - 18)  SpO2: 98% (18 Mar 2024 12:17) (98% - 100%)    Parameters below as of 18 Mar 2024 09:45  Patient On (Oxygen Delivery Method): nasal cannula  O2 Flow (L/min): 3  
Vital Signs Last 24 Hrs  T(C): 37.1 (20 Mar 2024 12:19), Max: 37.1 (20 Mar 2024 12:19)  T(F): 98.7 (20 Mar 2024 12:19), Max: 98.7 (20 Mar 2024 12:19)  HR: 78 (20 Mar 2024 12:19) (72 - 79)  BP: 149/58 (20 Mar 2024 04:52) (139/53 - 149/58)  BP(mean): 82 (20 Mar 2024 04:52) (82 - 82)  RR: 18 (20 Mar 2024 12:19) (18 - 18)  SpO2: 95% (20 Mar 2024 12:19) (95% - 100%)    Parameters below as of 20 Mar 2024 12:19  Patient On (Oxygen Delivery Method): nasal cannula  O2 Flow (L/min): 3

## 2024-03-21 NOTE — PROVIDER CONTACT NOTE (OTHER) - ASSESSMENT
Patient refusing all medications and fingersticks. Can not monitor blood glucose and can not give insulin. Patient refusing blood pressure and psych medications.  Provider notifed.
pt refuse VS reassesment, at appox 0110am pt c/o headache and dizziness, pt denies chest pain . /66 ACP GILES Rodriguez aware
Pt /82. VS stable else wise. denies headache; no s/s of distress
Pt is A&Ox4 confused and restless with productive cough
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
## 2024-03-21 NOTE — DISCHARGE NOTE PROVIDER - NSDCMRMEDTOKEN_GEN_ALL_CORE_FT
Admelog SoloStar 100 units/mL injectable solution: 8 unit(s) injectable 3 times a day (before meals)  albuterol 2.5 mg/3 mL (0.083%) inhalation solution: 3 milliliter(s) by nebulizer every 4 hours as needed for  shortness of breath and/or wheezing  amiodarone 200 mg oral tablet: 1 tab(s) orally once a day  apixaban 5 mg oral tablet: 1 tab(s) orally every 12 hours  Aspercreme with Lidocaine 4% topical film: Apply topically to affected area once a day to neck and back  divalproex sodium 250 mg oral delayed release tablet: 1 tab(s) orally 2 times a day  DuoNeb 0.5 mg-2.5 mg/3 mL inhalation solution: 3 milliliter(s) by nebulizer 3 times a day  ergocalciferol 1.25 mg (50,000 intl units) oral tablet: 1 cap(s) orally every 30 days  hydrALAZINE 50 mg oral tablet: 1 tab(s) orally every 8 hours  hydrocortisone 1% topical cream: Apply topically to affected area once a day to sacrum  insulin glargine 100 units/mL subcutaneous solution: 18 unit(s) subcutaneous once a day (at bedtime)  Insulin Glargine Solostar Pen 100 units/mL subcutaneous solution: 22 unit(s) subcutaneous once a day (at bedtime)  labetalol 100 mg oral tablet: 3 tab(s) orally every 8 hours  melatonin 3 mg oral tablet: 1 tab(s) orally once a day (at bedtime)  NIFEdipine 30 mg oral tablet, extended release: 1 tab(s) orally once a day  omeprazole 20 mg oral delayed release tablet: 2 tab(s) orally once a day  simvastatin 20 mg oral tablet: 1 tab(s) orally once a day (at bedtime)  Steglatro 5 mg oral tablet: 1 tab(s) orally once a day   Admelog SoloStar 100 units/mL injectable solution: 6 unit(s) injectable 3 times a day (before meals)  albuterol 2.5 mg/3 mL (0.083%) inhalation solution: 3 milliliter(s) by nebulizer every 4 hours as needed for  shortness of breath and/or wheezing  amiodarone 200 mg oral tablet: 1 tab(s) orally once a day  apixaban 5 mg oral tablet: 1 tab(s) orally every 12 hours  Aspercreme with Lidocaine 4% topical film: Apply topically to affected area once a day to neck and back  divalproex sodium 250 mg oral delayed release tablet: 1 tab(s) orally 2 times a day  DuoNeb 0.5 mg-2.5 mg/3 mL inhalation solution: 3 milliliter(s) by nebulizer 3 times a day  ergocalciferol 1.25 mg (50,000 intl units) oral tablet: 1 cap(s) orally every 30 days  furosemide 40 mg oral tablet: 1 tab(s) orally once a day  hydrALAZINE 50 mg oral tablet: 1 tab(s) orally every 8 hours  hydrocortisone 1% topical cream: Apply topically to affected area once a day to sacrum  insulin glargine 100 units/mL subcutaneous solution: 18 unit(s) subcutaneous once a day (at bedtime)  labetalol 100 mg oral tablet: 3 tab(s) orally every 8 hours  melatonin 3 mg oral tablet: 1 tab(s) orally once a day (at bedtime)  NIFEdipine 30 mg oral tablet, extended release: 1 tab(s) orally once a day  omeprazole 20 mg oral delayed release tablet: 2 tab(s) orally once a day  simvastatin 20 mg oral tablet: 1 tab(s) orally once a day (at bedtime)  Steglatro 5 mg oral tablet: 1 tab(s) orally once a day

## 2024-03-21 NOTE — DISCHARGE NOTE NURSING/CASE MANAGEMENT/SOCIAL WORK - PATIENT PORTAL LINK FT
You can access the FollowMyHealth Patient Portal offered by [facility] by registering at the following website: [email or web address]. By joining [facility]’s FollowMyHealth portal, you will also be able to view your health information using other applications (apps) compatible with our system.

## 2024-03-21 NOTE — PROVIDER CONTACT NOTE (OTHER) - BACKGROUND
Pt admitted for metabolic encephalopathy
Patient refusing all medications and fingersticks. Can not monitor blood glucose and can not give insulin. Patient refusing blood pressure and psych medications.  Provider notifed.
Pt came in for metabolic encephalopathy
pt refuses medication, pt refused AM and evening medication, HS Medication given

## 2024-03-21 NOTE — BH CONSULTATION LIAISON PROGRESS NOTE - NSBHFUPINTERVALCCFT_PSY_A_CORE
"I need something to help me sleep, I have anxiety"
I am leaving today
"I am doing horrible. Why do they give me a breakfast I won't eat?"

## 2024-03-21 NOTE — PROVIDER CONTACT NOTE (OTHER) - ACTION/TREATMENT ORDERED:
recheck BP in an hour
As  per ACP GILES whitney given AM BP medication 50mg po hydralazine now, medication given,
Patient refusing all medications and fingersticks. Can not monitor blood glucose and can not give insulin. Patient refusing blood pressure and psych medications.  Provider notifed.
No further intervention or visit

## 2024-03-21 NOTE — BH CONSULTATION LIAISON PROGRESS NOTE - NSBHCONSULTFOLLOWAFTERCARE_PSY_A_CORE FT
dispo planning per SW, can follow up at [facility]
dispo planning per SW, can follow up at [facility]
dispo planning per SW, can follow up at [facility]

## 2024-03-21 NOTE — PROVIDER CONTACT NOTE (OTHER) - REASON
ELEVATED BP
Pt /82
Refusing medications/fingersticks
Pt is throwing up and seems unfit to be on the unit

## 2024-03-21 NOTE — BH CONSULTATION LIAISON PROGRESS NOTE - NSBHCHARTREVIEWLAB_PSY_A_CORE FT
8.4    7.83  )-----------( 368      ( 21 Mar 2024 07:49 )             26.5   03-21    141  |  106  |  50<H>  ----------------------------<  61<L>  5.0   |  28  |  1.19    Ca    8.6      21 Mar 2024 07:49  Phos  4.2     03-21  Mg     2.70     03-21    
CBC Full  -  ( 16 Mar 2024 09:28 )  WBC Count : 10.48 K/uL  RBC Count : 2.88 M/uL  Hemoglobin : 8.4 g/dL  Hematocrit : 26.1 %  Platelet Count - Automated : 401 K/uL  Mean Cell Volume : 90.6 fL  Mean Cell Hemoglobin : 29.2 pg  Mean Cell Hemoglobin Concentration : 32.2 gm/dL  Auto Neutrophil # : 8.67 K/uL  Auto Lymphocyte # : 0.84 K/uL  Auto Monocyte # : 0.71 K/uL  Auto Eosinophil # : 0.16 K/uL  Auto Basophil # : 0.07 K/uL  Auto Neutrophil % : 82.7 %  Auto Lymphocyte % : 8.0 %  Auto Monocyte % : 6.8 %  Auto Eosinophil % : 1.5 %  Auto Basophil % : 0.7 %  03-16    137  |  102  |  53<H>  ----------------------------<  171<H>  5.3   |  26  |  0.96    Ca    9.3      16 Mar 2024 09:28  Phos  4.5     03-16  Mg     3.00     03-16    TPro  6.4  /  Alb  3.4  /  TBili  <0.2  /  DBili  x   /  AST  23  /  ALT  34<H>  /  AlkPhos  112  03-16  
                      7.5    8.98  )-----------( 370      ( 20 Mar 2024 07:07 )             23.6     03-20    144  |  105  |  55<H>  ----------------------------<  213<H>  5.1   |  29  |  1.59<H>    Ca    8.4      20 Mar 2024 07:07  Phos  5.2     03-20  Mg     2.90     03-20      Urinalysis Basic - ( 20 Mar 2024 07:07 )    Color: x / Appearance: x / SG: x / pH: x  Gluc: 213 mg/dL / Ketone: x  / Bili: x / Urobili: x   Blood: x / Protein: x / Nitrite: x   Leuk Esterase: x / RBC: x / WBC x   Sq Epi: x / Non Sq Epi: x / Bacteria: x

## 2024-03-21 NOTE — DISCHARGE NOTE PROVIDER - ATTENDING DISCHARGE PHYSICAL EXAMINATION:
.  VITAL SIGNS:  T(C): 36.8 (03-21-24 @ 04:36), Max: 36.8 (03-21-24 @ 04:36)  T(F): 98.3 (03-21-24 @ 04:36), Max: 98.3 (03-21-24 @ 04:36)  HR: 73 (03-21-24 @ 04:36) (73 - 78)  BP: 170/64 (03-21-24 @ 04:36) (170/64 - 197/98)  BP(mean): --  RR: 17 (03-21-24 @ 04:36) (17 - 18)  SpO2: 98% (03-21-24 @ 04:36) (95% - 98%)  Wt(kg): --    PHYSICAL EXAM:    Constitutional: WDWN resting comfortably in bed; NAD  Head: NC/AT  Eyes: PERRL, EOMI,   ENT: no nasal discharge;   Neck: supple;   Respiratory: CTA B/L; no W/R/R, no retractions  Cardiac: +S1/S2; RRR; systolic murmur   Gastrointestinal: soft, NT/ND; no rebound or guarding; +BSx4  Back: spine midline, no bony tenderness or step-offs; no CVAT B/L  Extremities: WWP, no clubbing or cyanosis; no peripheral edema, RT BKA   Musculoskeletal: NROM x4; no joint swelling, tenderness or erythema   Neurologic: AAOx3; CNII-XII grossly intact; no focal deficits  Psychiatric: affect and characteristics of appearance, verbalizations, behaviors are appropriate

## 2024-03-21 NOTE — DISCHARGE NOTE PROVIDER - NSDCCPCAREPLAN_GEN_ALL_CORE_FT
PRINCIPAL DISCHARGE DIAGNOSIS  Diagnosis: Metabolic encephalopathy  Assessment and Plan of Treatment: You came because of agitation, psychiatry consulted, meds were adjusted, you shold take your meds as ordered.      SECONDARY DISCHARGE DIAGNOSES  Diagnosis: Bilateral pleural effusion  Assessment and Plan of Treatment: you had an ECHO: mod/severe MR, you need to see a cardiologist oupatient follow up, eat a diet low in salt.

## 2024-03-21 NOTE — DISCHARGE NOTE PROVIDER - NSFOLLOWUPCLINICS_GEN_ALL_ED_FT
Cardiology at [facility]  Cardiology  [address] [place] [id number]  Phone: [phone]  Fax:     [facility]  Behavioral Health  [address] [place] [address]  Phone: [phone]  Fax:

## 2024-03-21 NOTE — BH CONSULTATION LIAISON PROGRESS NOTE - NSBHASSESSMENTFT_PSY_ALL_CORE
63F with documented history of CHFpEF, Pulmonary aspergillosis/candidiasis, AFib on Eliquis, DM2 with diabetic neuropathy, COPD on 3L NC,  CKD, HTN, HLD, GERD, and Anemia, PPH of diagnosis of Bipolar/Mood disorder, not in outpatient treatment, no prior admissions, no history of suicide attempt, who presents to the hospital from [facility] for agitation. Patient states that she was hospitalized at [facility] from early December till end of February for a R leg infection and associated complications. Patient s/p R BKA (she reports 2 operations, one on 12/4 and other on 12/7). As per [facility] paperwork post-op patient had respiratory failure, heart failure, valvular heart disease, and was in the ICU for an indeterminant amount of time. Was eventually discharged to [facility], transferred to [facility] for agitation at the [facility]. A psychiatrist at the [facility] started her on depakote for agitation, increased by covering CL team over the weekend to 250mg BID.     Patient reports that she was unhappy with the [facility], said that they "treated her poorly", would not attend to her needs for multiple hours, would not wake her up for meals, would not help her with eating, and were not taking her for rehabilitation.     On assessment she is irritable, ruminative about her treatment at the [facility], despondent about the state of her physical health. Denies any acute safety concerns. No clear history of michael, suspect bipolar disorder diagnosis was incorrect. More likely patient has depression, delirium, personality factors including irritability that put her at risk for increased agitation in the hospital and rehab setting.    Patient has been irritable, hostile, reports feeling neglected and confused. Demanding of staff, condescending. However, sleep and appetite are good and patient's speech is not pressured, largely linear. Suspect current presentation is exacerbated by a personality pathology with immature defenses acutely exacerbated in the setting of severe medical illness, loss of autonomy, loss of her leg, prolonged hospital course, and other perceived injustices. Patient also may have neurocognitive issues. With that said, she is calm, cooperative today and compliant with her medications today. Do not feel that she warrants involuntary psychiatric admission at this time. She can follow up with psychiatry at the rehab center she goes to.    Plan:  - as patient didn't take increase in VPA- would actually reduce back to depakote 250mg BID  	- rehab should monitor platelets, LFTs, albumin, ammonia, VPA level- and adjust depakote for albumin level if needed  	- rehab can check these labs on Sunday- defer to future frequence lab checks to rehab physicians  - melatonin 3mg qhs  - she should have a psychiatrist follow up with her at the rehab center she goes to    - seroquel 50mg q6h PRN for insomnia/anxiety  - ativan 2mg PO/IM for acute agitation  - routine obs  - delirium precautions  - no role for inpatient psychiatry at this time, patient can return to [facility] when medically cleared

## 2024-03-21 NOTE — BH CONSULTATION LIAISON PROGRESS NOTE - NSBHCONSULTRECOMMENDOTHER_PSY_A_CORE FT
Delirium Precautions:  - Avoid/minimize deliriogenic medications including benzodiazepines, hypnotics (e.g. Ambien), anti-cholinergics (e.g. Benadryl), opiates.  - Avoid physical restraints, encourage early mobilization of patient once safe.  - Facilitate physiologic sleep: keep lights off at night and on during day, minimize disturbance of patient overnight.  - Manage pain (but without opiate pain meds if possible).  - Provide frequent reorientation of patient and cognitive stimulation. 

## 2024-03-21 NOTE — BH CONSULTATION LIAISON PROGRESS NOTE - NSBHATTESTBILLING_PSY_A_CORE
32176-Zshuehjoim OBS or IP - moderate complexity OR 35-49 mins
44747-Soafzlnroc OBS or IP - moderate complexity OR 35-49 mins
94527-Aztsdbotiq OBS or IP - moderate complexity OR 35-49 mins

## 2024-03-23 ENCOUNTER — EMERGENCY (EMERGENCY)
Facility: HOSPITAL | Age: 64
LOS: 1 days | Discharge: HOME | End: 2024-03-23
Attending: EMERGENCY MEDICINE | Admitting: EMERGENCY MEDICINE
Payer: MEDICARE

## 2024-03-23 VITALS
SYSTOLIC BLOOD PRESSURE: 109 MMHG | OXYGEN SATURATION: 94 % | TEMPERATURE: 98 F | RESPIRATION RATE: 24 BRPM | HEIGHT: 64 IN | DIASTOLIC BLOOD PRESSURE: 55 MMHG | HEART RATE: 75 BPM

## 2024-03-23 DIAGNOSIS — Z89.511 ACQUIRED ABSENCE OF RIGHT LEG BELOW KNEE: Chronic | ICD-10-CM

## 2024-03-23 PROBLEM — D64.9 ANEMIA, UNSPECIFIED: Chronic | Status: ACTIVE | Noted: 2024-03-16

## 2024-03-23 PROBLEM — I50.32 CHRONIC DIASTOLIC (CONGESTIVE) HEART FAILURE: Chronic | Status: ACTIVE | Noted: 2024-03-16

## 2024-03-23 PROBLEM — K21.9 GASTRO-ESOPHAGEAL REFLUX DISEASE WITHOUT ESOPHAGITIS: Chronic | Status: ACTIVE | Noted: 2024-03-16

## 2024-03-23 PROBLEM — J44.9 CHRONIC OBSTRUCTIVE PULMONARY DISEASE, UNSPECIFIED: Chronic | Status: ACTIVE | Noted: 2024-03-15

## 2024-03-23 PROBLEM — I10 ESSENTIAL (PRIMARY) HYPERTENSION: Chronic | Status: ACTIVE | Noted: 2024-03-16

## 2024-03-23 PROBLEM — E11.9 TYPE 2 DIABETES MELLITUS WITHOUT COMPLICATIONS: Chronic | Status: ACTIVE | Noted: 2024-03-15

## 2024-03-23 PROBLEM — N18.4 CHRONIC KIDNEY DISEASE, STAGE 4 (SEVERE): Chronic | Status: ACTIVE | Noted: 2024-03-15

## 2024-03-23 PROBLEM — I48.91 UNSPECIFIED ATRIAL FIBRILLATION: Chronic | Status: ACTIVE | Noted: 2024-03-16

## 2024-03-23 PROBLEM — E78.5 HYPERLIPIDEMIA, UNSPECIFIED: Chronic | Status: ACTIVE | Noted: 2024-03-16

## 2024-03-23 PROBLEM — F31.9 BIPOLAR DISORDER, UNSPECIFIED: Chronic | Status: ACTIVE | Noted: 2024-03-15

## 2024-03-23 LAB
ADD ON TEST-SPECIMEN IN LAB: SIGNIFICANT CHANGE UP
ALBUMIN SERPL ELPH-MCNC: 3.4 G/DL — SIGNIFICANT CHANGE UP (ref 3.3–5)
ALP SERPL-CCNC: 83 U/L — SIGNIFICANT CHANGE UP (ref 40–120)
ALT FLD-CCNC: 16 U/L — SIGNIFICANT CHANGE UP (ref 4–33)
AMMONIA BLD-MCNC: 25 UMOL/L — SIGNIFICANT CHANGE UP (ref 11–55)
ANION GAP SERPL CALC-SCNC: 9 MMOL/L — SIGNIFICANT CHANGE UP (ref 7–14)
AST SERPL-CCNC: 17 U/L — SIGNIFICANT CHANGE UP (ref 4–32)
BASOPHILS # BLD AUTO: 0.08 K/UL — SIGNIFICANT CHANGE UP (ref 0–0.2)
BASOPHILS NFR BLD AUTO: 0.8 % — SIGNIFICANT CHANGE UP (ref 0–2)
BILIRUB SERPL-MCNC: <0.2 MG/DL — SIGNIFICANT CHANGE UP (ref 0.2–1.2)
BUN SERPL-MCNC: 50 MG/DL — HIGH (ref 7–23)
CALCIUM SERPL-MCNC: 8.8 MG/DL — SIGNIFICANT CHANGE UP (ref 8.4–10.5)
CHLORIDE SERPL-SCNC: 104 MMOL/L — SIGNIFICANT CHANGE UP (ref 98–107)
CO2 SERPL-SCNC: 28 MMOL/L — SIGNIFICANT CHANGE UP (ref 22–31)
CREAT SERPL-MCNC: 1.05 MG/DL — SIGNIFICANT CHANGE UP (ref 0.5–1.3)
EGFR: 59 ML/MIN/1.73M2 — LOW
EOSINOPHIL # BLD AUTO: 0.3 K/UL — SIGNIFICANT CHANGE UP (ref 0–0.5)
EOSINOPHIL NFR BLD AUTO: 3.1 % — SIGNIFICANT CHANGE UP (ref 0–6)
FLUAV AG NPH QL: SIGNIFICANT CHANGE UP
FLUBV AG NPH QL: SIGNIFICANT CHANGE UP
GLUCOSE SERPL-MCNC: 181 MG/DL — HIGH (ref 70–99)
HCT VFR BLD CALC: 26.6 % — LOW (ref 34.5–45)
HGB BLD-MCNC: 8.2 G/DL — LOW (ref 11.5–15.5)
IANC: 7.71 K/UL — HIGH (ref 1.8–7.4)
IMM GRANULOCYTES NFR BLD AUTO: 0.3 % — SIGNIFICANT CHANGE UP (ref 0–0.9)
LYMPHOCYTES # BLD AUTO: 1 K/UL — SIGNIFICANT CHANGE UP (ref 1–3.3)
LYMPHOCYTES # BLD AUTO: 10.2 % — LOW (ref 13–44)
MCHC RBC-ENTMCNC: 27.9 PG — SIGNIFICANT CHANGE UP (ref 27–34)
MCHC RBC-ENTMCNC: 30.8 GM/DL — LOW (ref 32–36)
MCV RBC AUTO: 90.5 FL — SIGNIFICANT CHANGE UP (ref 80–100)
MONOCYTES # BLD AUTO: 0.68 K/UL — SIGNIFICANT CHANGE UP (ref 0–0.9)
MONOCYTES NFR BLD AUTO: 6.9 % — SIGNIFICANT CHANGE UP (ref 2–14)
NEUTROPHILS # BLD AUTO: 7.71 K/UL — HIGH (ref 1.8–7.4)
NEUTROPHILS NFR BLD AUTO: 78.7 % — HIGH (ref 43–77)
NRBC # BLD: 0 /100 WBCS — SIGNIFICANT CHANGE UP (ref 0–0)
NRBC # FLD: 0 K/UL — SIGNIFICANT CHANGE UP (ref 0–0)
NT-PROBNP SERPL-SCNC: 6363 PG/ML — HIGH
PLATELET # BLD AUTO: 334 K/UL — SIGNIFICANT CHANGE UP (ref 150–400)
POTASSIUM SERPL-MCNC: 5 MMOL/L — SIGNIFICANT CHANGE UP (ref 3.5–5.3)
POTASSIUM SERPL-SCNC: 5 MMOL/L — SIGNIFICANT CHANGE UP (ref 3.5–5.3)
PROT SERPL-MCNC: 6.1 G/DL — SIGNIFICANT CHANGE UP (ref 6–8.3)
RBC # BLD: 2.94 M/UL — LOW (ref 3.8–5.2)
RBC # FLD: 15.8 % — HIGH (ref 10.3–14.5)
RSV RNA NPH QL NAA+NON-PROBE: SIGNIFICANT CHANGE UP
SARS-COV-2 RNA SPEC QL NAA+PROBE: SIGNIFICANT CHANGE UP
SODIUM SERPL-SCNC: 141 MMOL/L — SIGNIFICANT CHANGE UP (ref 135–145)
TROPONIN T, HIGH SENSITIVITY RESULT: 111 NG/L — CRITICAL HIGH
VALPROATE SERPL-MCNC: 24.7 UG/ML — LOW (ref 50–100)
WBC # BLD: 9.8 K/UL — SIGNIFICANT CHANGE UP (ref 3.8–10.5)
WBC # FLD AUTO: 9.8 K/UL — SIGNIFICANT CHANGE UP (ref 3.8–10.5)

## 2024-03-23 PROCEDURE — 99285 EMERGENCY DEPT VISIT HI MDM: CPT | Mod: GC

## 2024-03-23 PROCEDURE — 93010 ELECTROCARDIOGRAM REPORT: CPT

## 2024-03-23 PROCEDURE — 71275 CT ANGIOGRAPHY CHEST: CPT | Mod: 26,MC

## 2024-03-23 PROCEDURE — 71045 X-RAY EXAM CHEST 1 VIEW: CPT | Mod: 26

## 2024-03-23 RX ORDER — DILTIAZEM HCL 120 MG
5 CAPSULE, EXT RELEASE 24 HR ORAL ONCE
Refills: 0 | Status: COMPLETED | OUTPATIENT
Start: 2024-03-23 | End: 2024-03-23

## 2024-03-23 RX ORDER — FUROSEMIDE 40 MG
40 TABLET ORAL ONCE
Refills: 0 | Status: DISCONTINUED | OUTPATIENT
Start: 2024-03-23 | End: 2024-03-23

## 2024-03-23 RX ORDER — NIFEDIPINE 30 MG
30 TABLET, EXTENDED RELEASE 24 HR ORAL ONCE
Refills: 0 | Status: COMPLETED | OUTPATIENT
Start: 2024-03-23 | End: 2024-03-23

## 2024-03-23 RX ORDER — FUROSEMIDE 40 MG
20 TABLET ORAL ONCE
Refills: 0 | Status: COMPLETED | OUTPATIENT
Start: 2024-03-23 | End: 2024-03-23

## 2024-03-23 RX ORDER — HALOPERIDOL DECANOATE 100 MG/ML
5 INJECTION INTRAMUSCULAR ONCE
Refills: 0 | Status: COMPLETED | OUTPATIENT
Start: 2024-03-23 | End: 2024-03-23

## 2024-03-23 RX ADMIN — HALOPERIDOL DECANOATE 5 MILLIGRAM(S): 100 INJECTION INTRAMUSCULAR at 16:20

## 2024-03-23 RX ADMIN — Medication 30 MILLIGRAM(S): at 22:26

## 2024-03-23 RX ADMIN — Medication 2 MILLIGRAM(S): at 16:21

## 2024-03-23 NOTE — ED ADULT NURSE NOTE - CHIEF COMPLAINT QUOTE
from [facility] rehab c/o SOB that started at 1200pm. pt O2 in the 80's on room air in the field with EMS. arrives on 4L NC, O2 >90%. also endorsing R arm pain. denies N/V/D, fevers, chills. Phx of DM2, Afib, COPD. .

## 2024-03-23 NOTE — ED PROVIDER NOTE - PROGRESS NOTE DETAILS
Jeanette (Bre Kirkland PGY3 unable to reach facility staff for collateral x2 calls. pt has been yelling persistently at staff and refusing blood draws unable to be de-escalated verbally, exhibiting harm to self in the setting of acute illness and concerning findings on CXR. ativan and haldol IM given. end tidal ordered for monitoring of respiratory status. Hector Cleary MD (PGY3): Patient workup nonactionable thus far.   Patient was recently admitted to this hospital with  extensive workup  and [facility]  No indication for admission at this time.  Patient has been hemodynamically stable.  No desaturations in the ED.   Given this patient can safely return to  facility.  Spoke to wing,  supervisor,  and informed them of this.  Patient can return to facility per her.  Social work will arrange transportation. Patient was signed out to me from Dr. Ortega.  Hemoglobin stable for patient troponin unchanged and reviewed prior ones.  proBNP less than last time patient was admitted.  CT angio performed no PE.  Moderate right pleural effusion with complete right lower lobe compressive atelectasis.  Small loculated left pleural effusion with partial lobe compressive atelectasis.  Patient has been resting on nasal cannula.  Noted to be hypertensive probably missed home BP meds will medicate.  Dr. Barclay spoke w facilty feels comfortable taking her back. pt pending transport, endorse to Dr Howard

## 2024-03-23 NOTE — ED PROVIDER NOTE - NSFOLLOWUPINSTRUCTIONS_ED_ALL_ED_FT
You were seen in the Emergency Department for shortness of breath. Lab and imaging results, if performed, were discussed with you along with your discharge diagnosis. Your workup in the emergency department  did not warrant admission to the hospital.    Follow up with your doctor in 1 week - bring copies of your results if you were given. If you do not have a primary doctor, please call [phone] to find one convenient for you.    Continue all prescribed medications.     Return to ED for any new or worsening symptoms including but not limited to: development of chest pain, shortness of breath, fever, vomiting, focal numbness, weakness or tingling, any severe CP, headache, abdominal pain, back pain.      Rest and keep yourself hydrated with fluids.

## 2024-03-23 NOTE — ED PROVIDER NOTE - OBJECTIVE STATEMENT
63y female hx CHFpEF pulmonary aspergillosis/candidiasis, AF on eliquis, DM2, COPD on 3L baseline NC, CKD, HTN, HLD, GERD, anemia presents for an episode of SOB ?and hypoxia on RA?

## 2024-03-23 NOTE — ED PROVIDER NOTE - ATTENDING CONTRIBUTION TO CARE
Attending note:   After face to face evaluation of this patient, I concur with above noted hx, pe, and care plan for this patient.  Ortega: Attending note:   After face to face evaluation of this patient, I concur with above noted hx, pe, and care plan for this patient.  Ortega: 63-year-old female with multiple medical issues.  Patient has a history of diabetes, hypertension, COPD on chronic O2, bipolar disorder, heart failure, GERD, hyperlipidemia and chronic kidney disease.  Patient recently discharged from hospital and sent to nursing home.  There patient had an episode of agitation and hypoxia.  Unsure if the hypoxia was on room air longer on O2.  Patient is on her usual 3 L now and oxygenating well.  Patient is in no respiratory distress.  Patient does have some crackles but will not cooperate with exam.  Patient is yelling and making making inappropriate comments to staff.  Patient is not allowing proper examination.  Patient is not allowing blood work or chest x-ray.  Patient requesting lobster rolls.  Patient does not have capacity as she appears to be delusional and is not able to state where she is or the date.  Will require sedation prior to examination.  Dispo depending patient's blood work and x-rays.

## 2024-03-23 NOTE — ED PROVIDER NOTE - CLINICAL SUMMARY MEDICAL DECISION MAKING FREE TEXT BOX
63y female with multiple medical problems presents with reported sob from nursing home. pt screaming full sentences with no increased wob. denies all other ROS. lungs clear, HR wnl, ecg without ischemic changes. obtain screening labs, continue with baseline NC, as pt is saturating normally at her baseline O2 supplementation. CXR to eval pna as etiology for sob.

## 2024-03-23 NOTE — PROVIDER CONTACT NOTE (OTHER) - BACKGROUND
Writer informed pt medically cleared for return to [facility]: [address], [place] [address]. Writer notified by covering provider Shameem collateral obtained from rehab RN Sup: Carine

## 2024-03-23 NOTE — ED PROVIDER NOTE - PHYSICAL EXAMINATION
General: non-toxic, NAD  HEENT: NCAT, PERRL, no conjunctival pallor   Cardiac: RRR, no murmurs, 2+ peripheral pulses  Resp: CTAB speaking full sentences, laying flat comfortably.   Abdomen: soft, non-distended, bowel sounds present, no ttp, no rebound or guarding. no organomegaly  Extremities: no peripheral edema. R BKA. R shoulder without ecchymosis deformity or hematoma.   Neuro: AAOx3, 5+motor, sensation grossly intact  Psych: agitated with questioning.

## 2024-03-23 NOTE — ED ADULT NURSE REASSESSMENT NOTE - NS ED NURSE REASSESS COMMENT FT1
Received report from Day Zarina Bray: Pt A&Ox3, appears to be resting comfortably, NAD, no complaints at this moment, VS noted, respirations are even and unlabored, CM in progress, pt on NC 4L,  pending CT. Safety precautions implemented as per protocol, awaiting further MD orders, plan of care ongoing.

## 2024-03-23 NOTE — ED ADULT NURSE REASSESSMENT NOTE - NS ED NURSE REASSESS COMMENT FT1
Break RN: Patient verbally aggressive with staff and refusing lab work; MD Ortega and MD Rene made aware. Patient awake and resting in stretcher, respirations even and unlabored, no signs/symptoms of acute distress. Patient denies dyspnea, shortness of breath, and chest pain. Patient denies pain and offers no complaints at this time. Medications administered per eMAR, safety measures in place, will provide care as needed.

## 2024-03-23 NOTE — ED ADULT NURSE NOTE - NSFALLHARMRISKINTERV_ED_ALL_ED
Assistance OOB with selected safe patient handling equipment if applicable/Communicate risk of Fall with Harm to all staff, patient, and family/Monitor gait and stability/Provide patient with walking aids/Provide visual cue: red socks, yellow wristband, yellow gown, etc/Reinforce activity limits and safety measures with patient and family/Bed in lowest position, wheels locked, appropriate side rails in place/Call bell, personal items and telephone in reach/Instruct patient to call for assistance before getting out of bed/chair/stretcher/Non-slip footwear applied when patient is off stretcher/[place] to call system/Physically safe environment - no spills, clutter or unnecessary equipment/Purposeful Proactive Rounding/Room/bathroom lighting operational, light cord in reach

## 2024-03-23 NOTE — ED ADULT TRIAGE NOTE - CHIEF COMPLAINT QUOTE
from [facility] rehab c/o SOB that started at 1200pm. pt O2 in the 80's on room air in the field with EMS. arrives on 4L NC, O2 >90%. denies N/V/D, fevers, chills. Phx of DM2, Afib, COPD. . from [facility] rehab c/o SOB that started at 1200pm. pt O2 in the 80's on room air in the field with EMS. arrives on 4L NC, O2 >90%. also endorsing R arm pain. denies N/V/D, fevers, chills. Phx of DM2, Afib, COPD. .

## 2024-03-23 NOTE — ED PROVIDER NOTE - PATIENT PORTAL LINK FT
You can access the FollowMyHealth Patient Portal offered by [facility] by registering at the following website: [email or web address]. By joining [facility]’s FollowMyHealth portal, you will also be able to view your health information using other applications (apps) compatible with our system.

## 2024-03-23 NOTE — ED PROVIDER NOTE - NS ED ROS FT
Constitutional: no fevers, chills  HEENT: no HA, vision changes, rhinorrhea, sore throat  Cardiac: no chest pain, palpitations  Respiratory: +SOB, no cough or hemoptysis  GI: no n/v/d/c, abd pain, bloody or dark stools  : no dysuria, frequency, or hematuria  MSK: no joint pain, neck pain or back pain  Skin: no rashes, jaundice, pruritis  Neuro: no numbness/tingling, weakness, unsteady gait  ROS otherwise neg except per hpi

## 2024-03-24 VITALS
HEART RATE: 72 BPM | DIASTOLIC BLOOD PRESSURE: 65 MMHG | OXYGEN SATURATION: 99 % | RESPIRATION RATE: 17 BRPM | SYSTOLIC BLOOD PRESSURE: 176 MMHG

## 2024-03-24 RX ADMIN — Medication 5 MILLIGRAM(S): at 00:09

## 2024-03-24 RX ADMIN — Medication 20 MILLIGRAM(S): at 00:09

## 2024-06-01 NOTE — ED ADULT NURSE NOTE - OBJECTIVE STATEMENT
[Chaperone Present] : A chaperone was present in the examining room during all aspects of the physical examination [39077] : A chaperone was present during the pelvic exam. [Appropriately responsive] : appropriately responsive [Alert] : alert [No Acute Distress] : no acute distress [Soft] : soft [Non-tender] : non-tender [No Lesions] : no lesions [No Mass] : no mass [Oriented x3] : oriented x3 Pt awake and alert, Phx Bipolar, COPD, CHF, presenting from rehab for hypoxia, Pt refusing to answer questions, refusing blood work yelling "Get the fuck out of here" Pt removing herself from monitor. RN advises pt to keep monitor on pt states "I don't have to do anything"  Pt breathing appears even and unlabored at this time. Pt demanding food and drinks. MD Ortega made aware. Awaiting further evaluation. [Examination Of The Breasts] : a normal appearance [No Masses] : no breast masses were palpable [Labia Majora] : normal [Labia Minora] : normal [Normal] : normal [Uterine Adnexae] : normal

## 2024-10-21 NOTE — BH CONSULTATION LIAISON ASSESSMENT NOTE - NSBHMSEATTEN_PSY_A_CORE
Chronic, at goal (stable), continue current treatment plan and lifestyle modifications recommended   Impaired